# Patient Record
Sex: FEMALE | Race: WHITE | Employment: OTHER | URBAN - NONMETROPOLITAN AREA
[De-identification: names, ages, dates, MRNs, and addresses within clinical notes are randomized per-mention and may not be internally consistent; named-entity substitution may affect disease eponyms.]

---

## 2021-05-26 LAB
BILIRUBIN, URINE: NEGATIVE
BLOOD, URINE: ABNORMAL
CLARITY: ABNORMAL
COLOR: YELLOW
GLUCOSE URINE: NEGATIVE
KETONES, URINE: NEGATIVE
LEUKOCYTE ESTERASE, URINE: ABNORMAL
NITRITE, URINE: NEGATIVE
PH UA: 7 (ref 4.5–8)
PROTEIN UA: ABNORMAL
SPECIFIC GRAVITY, URINE: 1.02
UROBILINOGEN, URINE: NORMAL

## 2021-06-09 LAB
BILIRUBIN, URINE: NEGATIVE
BLOOD, URINE: ABNORMAL
CLARITY: ABNORMAL
COLOR: YELLOW
GLUCOSE URINE: NEGATIVE
KETONES, URINE: NEGATIVE
LEUKOCYTE ESTERASE, URINE: ABNORMAL
NITRITE, URINE: NEGATIVE
PH UA: 7.5 (ref 4.5–8)
PROTEIN UA: ABNORMAL
SPECIFIC GRAVITY, URINE: 1.02
UROBILINOGEN, URINE: NORMAL

## 2021-08-19 LAB
BUN BLDV-MCNC: 69 MG/DL
CALCIUM SERPL-MCNC: 9.5 MG/DL
CHLORIDE BLD-SCNC: 100 MMOL/L
CO2: 24 MMOL/L
CREAT SERPL-MCNC: 3 MG/DL
GFR CALCULATED: 15
GLUCOSE BLD-MCNC: 103 MG/DL
POTASSIUM SERPL-SCNC: 5.3 MMOL/L
SODIUM BLD-SCNC: 136 MMOL/L

## 2021-08-23 LAB
BUN BLDV-MCNC: 57 MG/DL
CALCIUM SERPL-MCNC: 9.7 MG/DL
CHLORIDE BLD-SCNC: 99 MMOL/L
CO2: 25 MMOL/L
CREAT SERPL-MCNC: 2.5 MG/DL
GFR CALCULATED: 18
GLUCOSE BLD-MCNC: 107 MG/DL
POTASSIUM SERPL-SCNC: 5.1 MMOL/L
SODIUM BLD-SCNC: 136 MMOL/L

## 2021-08-31 PROBLEM — M81.0 OSTEOPOROSIS: Status: ACTIVE | Noted: 2017-02-27

## 2021-08-31 PROBLEM — M17.12 OSTEOARTHRITIS OF LEFT KNEE: Status: ACTIVE | Noted: 2021-08-18

## 2021-08-31 PROBLEM — M12.9 ARTHROPATHY OF MULTIPLE SITES: Status: ACTIVE | Noted: 2017-02-06

## 2021-08-31 PROBLEM — N28.9 RENAL INSUFFICIENCY SYNDROME: Status: ACTIVE | Noted: 2018-12-19

## 2021-08-31 RX ORDER — LANOLIN ALCOHOL/MO/W.PET/CERES
500 CREAM (GRAM) TOPICAL 2 TIMES DAILY
COMMUNITY

## 2021-08-31 RX ORDER — ASPIRIN 81 MG/1
81 TABLET, CHEWABLE ORAL NIGHTLY
COMMUNITY

## 2021-08-31 RX ORDER — METOPROLOL SUCCINATE 25 MG/1
TABLET, EXTENDED RELEASE ORAL
COMMUNITY
Start: 2021-08-09

## 2021-08-31 RX ORDER — DICLOFENAC SODIUM 25 MG/1
TABLET, DELAYED RELEASE ORAL
COMMUNITY
Start: 2021-07-21 | End: 2021-09-01

## 2021-08-31 RX ORDER — OMEPRAZOLE 20 MG/1
20 TABLET, DELAYED RELEASE ORAL DAILY
COMMUNITY

## 2021-08-31 RX ORDER — MULTIVITAMIN WITH IRON
250 TABLET ORAL 2 TIMES DAILY
COMMUNITY

## 2021-08-31 RX ORDER — SIMVASTATIN 10 MG
TABLET ORAL
COMMUNITY
Start: 2021-07-21

## 2021-08-31 RX ORDER — B-COMPLEX WITH VITAMIN C
1 TABLET ORAL 2 TIMES DAILY
COMMUNITY

## 2021-08-31 RX ORDER — AMITRIPTYLINE HYDROCHLORIDE 50 MG/1
TABLET, FILM COATED ORAL
COMMUNITY
Start: 2021-07-21

## 2021-08-31 RX ORDER — OXYBUTYNIN CHLORIDE 5 MG/1
TABLET ORAL
COMMUNITY
Start: 2021-08-18 | End: 2021-09-01

## 2021-08-31 RX ORDER — ACETAMINOPHEN 325 MG/1
650 TABLET ORAL EVERY 6 HOURS PRN
COMMUNITY

## 2021-09-01 ENCOUNTER — OFFICE VISIT (OUTPATIENT)
Dept: NEPHROLOGY | Age: 86
End: 2021-09-01
Payer: MEDICARE

## 2021-09-01 VITALS
OXYGEN SATURATION: 97 % | HEART RATE: 92 BPM | WEIGHT: 152.8 LBS | DIASTOLIC BLOOD PRESSURE: 77 MMHG | TEMPERATURE: 97 F | SYSTOLIC BLOOD PRESSURE: 139 MMHG

## 2021-09-01 DIAGNOSIS — F19.90 EXCESSIVE USE OF NONSTEROIDAL ANTI-INFLAMMATORY DRUG (NSAID): ICD-10-CM

## 2021-09-01 DIAGNOSIS — N18.32 STAGE 3B CHRONIC KIDNEY DISEASE (HCC): Primary | ICD-10-CM

## 2021-09-01 DIAGNOSIS — I12.9 HYPERTENSIVE RENAL DISEASE, STAGE 1 THROUGH STAGE 4 OR UNSPECIFIED CHRONIC KIDNEY DISEASE: ICD-10-CM

## 2021-09-01 DIAGNOSIS — E87.5 HYPERKALEMIA: ICD-10-CM

## 2021-09-01 PROCEDURE — 99204 OFFICE O/P NEW MOD 45 MIN: CPT | Performed by: INTERNAL MEDICINE

## 2021-09-01 PROCEDURE — 1036F TOBACCO NON-USER: CPT | Performed by: INTERNAL MEDICINE

## 2021-09-01 PROCEDURE — 1123F ACP DISCUSS/DSCN MKR DOCD: CPT | Performed by: INTERNAL MEDICINE

## 2021-09-01 PROCEDURE — 1090F PRES/ABSN URINE INCON ASSESS: CPT | Performed by: INTERNAL MEDICINE

## 2021-09-01 PROCEDURE — G8427 DOCREV CUR MEDS BY ELIG CLIN: HCPCS | Performed by: INTERNAL MEDICINE

## 2021-09-01 PROCEDURE — 4040F PNEUMOC VAC/ADMIN/RCVD: CPT | Performed by: INTERNAL MEDICINE

## 2021-09-01 PROCEDURE — G8421 BMI NOT CALCULATED: HCPCS | Performed by: INTERNAL MEDICINE

## 2021-09-01 NOTE — PROGRESS NOTES
 RECTAL SURGERY      SIGMOIDOSCOPY      TUBAL LIGATION      UPPER GASTROINTESTINAL ENDOSCOPY          Family History:  Family History   Problem Relation Age of Onset    Cancer Mother     Cancer Sister         Social History:  Social History     Socioeconomic History    Marital status: Unknown     Spouse name: Not on file    Number of children: Not on file    Years of education: Not on file    Highest education level: Not on file   Occupational History    Not on file   Tobacco Use    Smoking status: Former Smoker    Smokeless tobacco: Never Used   Vaping Use    Vaping Use: Never used   Substance and Sexual Activity    Alcohol use: Not on file    Drug use: Not on file    Sexual activity: Not on file   Other Topics Concern    Not on file   Social History Narrative    Not on file     Social Determinants of Health     Financial Resource Strain:     Difficulty of Paying Living Expenses:    Food Insecurity:     Worried About [address] in the Last Year:     [address] in the Last Year:    Transportation Needs:     Lack of Transportation (Medical):      Lack of Transportation (Non-Medical):    Physical Activity:     Days of Exercise per Week:     Minutes of Exercise per Session:    Stress:     Feeling of Stress :    Social Connections:     Frequency of Communication with Friends and Family:     Frequency of Social Gatherings with Friends and Family:     Attends [employer] Services:     Active Member of Clubs or Organizations:     Attends Club or Organization Meetings:     Marital Status:    Intimate Partner Violence:     Fear of Current or Ex-Partner:     Emotionally Abused:     Physically Abused:     Sexually Abused:    Retired from office work     Review of Systems:  Constitutional: no fever or chills  Head: No headaches  Eyes: no blurry vision, no discharge  Ears: no ear pain or hearing changes  Nose: no runny nose or epistaxis  Respiratory: no shortness of breath or cough or sputum production  Cardiovascular: no chest pain  GI: no nausea, no vomiting or diarrhea  : denies any discharge  Skin: no rash  Musculoskeletal: +chronic joint pains, left knee pain. moves all ext  Neuro: no numbness or tingling, no slurred speech  Psychiatric: stable mood, no depression or insomnia    All other review of systems were reviewed and negative     Home Medications:  Prior to Admission medications    Medication Sig Start Date End Date Taking?  Authorizing Provider   calcium carbonate-vitamin D 600-200 MG-UNIT TABS Take 1 tablet by mouth 2 times daily   Yes Historical Provider, MD   amitriptyline (ELAVIL) 50 MG tablet TAKE 1 TABLET AT BEDTIME 7/21/21  Yes Historical Provider, MD   aspirin 81 MG chewable tablet Take 81 mg by mouth nightly   Yes Historical Provider, MD   magnesium (MAGNESIUM-OXIDE) 250 MG TABS tablet Take 250 mg by mouth 2 times daily   Yes Historical Provider, MD   metoprolol succinate (TOPROL XL) 25 MG extended release tablet TAKE 1 TABLET BY MOUTH ONCE DAILY 8/9/21  Yes Historical Provider, MD   niacin 500 MG extended release capsule Take 500 mg by mouth 2 times daily   Yes Historical Provider, MD   omeprazole (PRILOSEC OTC) 20 MG tablet Take 20 mg by mouth daily Taking 3 times per week temporarily   Yes Historical Provider, MD   simvastatin (ZOCOR) 10 MG tablet TAKE 1 TABLET EVERY EVENINGAT 6PM 7/21/21  Yes Historical Provider, MD   acetaminophen (TYLENOL) 325 MG tablet Take 650 mg by mouth every 6 hours as needed for Pain   Yes Historical Provider, MD   Multiple Vitamins-Minerals (ONE-A-DAY 50 PLUS PO) Take by mouth daily   Yes Historical Provider, MD   Omega-3 Fatty Acids (OMEGA-3 FISH OIL PO) Take by mouth daily   Yes Historical Provider, MD   diclofenac (VOLTAREN) 25 MG EC tablet TAKE 1 TABLET 2 TIMES DAILYAS NEEDED  Patient not taking: Reported on 9/1/2021 7/21/21   Historical Provider, MD        Physical Examination:  VITALS:  /77 (Site: Right Upper Arm, Position: Sitting, Cuff Size: Medium Adult)   Pulse 92   Temp 97 °F (36.1 °C)   Wt 152 lb 12.8 oz (69.3 kg)   SpO2 97%   There is no height or weight on file to calculate BMI. Wt Readings from Last 3 Encounters:   09/01/21 152 lb 12.8 oz (69.3 kg)     Constitutional and General Appearance: alert and cooperative with exam, appears comfortable, no distress, not diaphoretic  Eyes: no icteric sclera in left eye or right eye  Ears and Nose: normal external appearance of left and right ear. Oral: moist oral mucus membranes  Neck: No jugular venous distention  Lungs: Air entry B/L, no crackles or rales, no use of accessory muscles or labored breathing  Heart: S1, S2  Extremities: No pitting LE edema, no tenderness  GI: soft, non-tender, no guarding, no distention  Skin: no rash seen on exposed extremities, warm to touch  Musculo: moves all extremities  Neuro: no slurred speech, no facial drooping  Psychiatric: Normal mood and affect, Not agitated     Laboratory & Diagnostics:  Old progress notes from referring physician reviewed. Sept 2021: Creat 1.4  Feb 2021: Creat 1.5  Aug 2021: Creat 3.0 and now down to 2.5     Impression/Plan:   1. CKD IIIb with some progression vs JANY on CKD: She has been on diclofenac BID for over 15+ years. Other risk factors include HTN and senile nephrosclerosis. Will send work-up including UA, urine protein-creatinine ratio and kidney US to evaluate echogenicity and kidney size. Discussed with patient to avoid NSAID like diclofenac. Advised low salt diet. Goal is to control BP and avoid high red meat intake. Will also check CBC and markers of chronicity including PTH and phos. 2. Excessive use of NSAIDs: stopped Aug 2021. Discussed renal side effects of nonsteroidals. 3. HTN: on Toprol, advised low salt diet. 4. Mild hyperkalemia: in setting of JANY/CKD and hyperkalemia. Will monitor. Advised low potassium diet. 5. Hx remote smoking  6. Arthritis    D/w pt in detail.  Thought process was discussed with the patient  Thank you for the referral  Please do not hesitate to contact me if you have any questions or concerns  I will make further recommendations depending on clinical course and lab/diagnostic results    Orders Placed This Encounter   Procedures    US RENAL COMPLETE    Basic Metabolic Panel    CBC    PTH, Intact    Phosphorus    Vitamin D 25 Hydroxy    Protein / creatinine ratio, urine    Urinalysis     Return in about 4 weeks (around 9/29/2021).     Wilberto Orellana MD  [facility]

## 2021-09-17 LAB
BASOPHILS ABSOLUTE: ABNORMAL
BASOPHILS RELATIVE PERCENT: ABNORMAL
BILIRUBIN, URINE: NEGATIVE
BLOOD, URINE: POSITIVE
BUN BLDV-MCNC: 48 MG/DL
CALCIUM SERPL-MCNC: 9.9 MG/DL
CHLORIDE BLD-SCNC: 96 MMOL/L
CLARITY: CLEAR
CO2: 29 MMOL/L
COLOR: YELLOW
CREAT SERPL-MCNC: 2 MG/DL
EOSINOPHILS ABSOLUTE: ABNORMAL
EOSINOPHILS RELATIVE PERCENT: ABNORMAL
GFR CALCULATED: 23
GLUCOSE BLD-MCNC: 95 MG/DL
GLUCOSE URINE: NEGATIVE
HCT VFR BLD CALC: 28.1 % (ref 36–46)
HEMOGLOBIN: 8.8 G/DL (ref 12–16)
KETONES, URINE: NEGATIVE
LEUKOCYTE ESTERASE, URINE: POSITIVE
LYMPHOCYTES ABSOLUTE: ABNORMAL
LYMPHOCYTES RELATIVE PERCENT: ABNORMAL
MCH RBC QN AUTO: ABNORMAL PG
MCHC RBC AUTO-ENTMCNC: ABNORMAL G/DL
MCV RBC AUTO: ABNORMAL FL
MONOCYTES ABSOLUTE: ABNORMAL
MONOCYTES RELATIVE PERCENT: ABNORMAL
NEUTROPHILS ABSOLUTE: ABNORMAL
NEUTROPHILS RELATIVE PERCENT: ABNORMAL
NITRITE, URINE: NEGATIVE
PH UA: 7 (ref 4.5–8)
PHOSPHORUS: 4.6 MG/DL
PLATELET # BLD: 436 K/ΜL
PMV BLD AUTO: ABNORMAL FL
POTASSIUM SERPL-SCNC: 4.9 MMOL/L
PROTEIN UA: POSITIVE
PTH INTACT: 30
RBC # BLD: 2.84 10^6/ΜL
SODIUM BLD-SCNC: 138 MMOL/L
SPECIFIC GRAVITY, URINE: 1.02
UROBILINOGEN, URINE: NORMAL
VITAMIN D 25-HYDROXY: 50.6
VITAMIN D2, 25 HYDROXY: NORMAL
VITAMIN D3,25 HYDROXY: NORMAL
WBC # BLD: 7.2 10^3/ML

## 2021-09-20 ENCOUNTER — TELEPHONE (OUTPATIENT)
Dept: NEPHROLOGY | Age: 86
End: 2021-09-20

## 2021-09-20 DIAGNOSIS — D63.1 ANEMIA IN STAGE 3B CHRONIC KIDNEY DISEASE (HCC): Primary | ICD-10-CM

## 2021-09-20 DIAGNOSIS — N18.32 ANEMIA IN STAGE 3B CHRONIC KIDNEY DISEASE (HCC): Primary | ICD-10-CM

## 2021-09-20 NOTE — TELEPHONE ENCOUNTER
She states that she has UTI symptoms sometimes. It comes and goes. No symptoms today. She has vaginal prolapse if that makes a difference.

## 2021-09-22 ENCOUNTER — TELEPHONE (OUTPATIENT)
Dept: NEPHROLOGY | Age: 86
End: 2021-09-22

## 2021-09-22 RX ORDER — FERROUS SULFATE 325(65) MG
325 TABLET ORAL 2 TIMES DAILY
Qty: 60 TABLET | Refills: 2 | Status: SHIPPED | OUTPATIENT
Start: 2021-09-22 | End: 2021-12-27

## 2021-09-22 RX ORDER — FERROUS SULFATE 325(65) MG
325 TABLET ORAL 2 TIMES DAILY
COMMUNITY
End: 2021-09-22 | Stop reason: SDUPTHER

## 2021-09-22 NOTE — TELEPHONE ENCOUNTER
----- Message from Nimisha Beaver MD sent at 9/22/2021  3:10 PM EDT -----  Ferrous sulfate 325 mg po BID  ----- Message -----  From: Hoa Gonzalez  Sent: 9/22/2021   8:15 AM EDT  To: Nimisha Beaver MD

## 2021-09-29 ENCOUNTER — OFFICE VISIT (OUTPATIENT)
Dept: NEPHROLOGY | Age: 86
End: 2021-09-29
Payer: MEDICARE

## 2021-09-29 VITALS
WEIGHT: 153 LBS | OXYGEN SATURATION: 100 % | HEART RATE: 91 BPM | TEMPERATURE: 97.3 F | DIASTOLIC BLOOD PRESSURE: 74 MMHG | SYSTOLIC BLOOD PRESSURE: 142 MMHG

## 2021-09-29 DIAGNOSIS — E61.1 IRON DEFICIENCY: ICD-10-CM

## 2021-09-29 DIAGNOSIS — F19.90 EXCESSIVE USE OF NONSTEROIDAL ANTI-INFLAMMATORY DRUGS (NSAIDS): ICD-10-CM

## 2021-09-29 DIAGNOSIS — N18.4 CKD (CHRONIC KIDNEY DISEASE), STAGE IV (HCC): Primary | ICD-10-CM

## 2021-09-29 PROCEDURE — G8427 DOCREV CUR MEDS BY ELIG CLIN: HCPCS | Performed by: INTERNAL MEDICINE

## 2021-09-29 PROCEDURE — G8421 BMI NOT CALCULATED: HCPCS | Performed by: INTERNAL MEDICINE

## 2021-09-29 PROCEDURE — 1036F TOBACCO NON-USER: CPT | Performed by: INTERNAL MEDICINE

## 2021-09-29 PROCEDURE — 99213 OFFICE O/P EST LOW 20 MIN: CPT | Performed by: INTERNAL MEDICINE

## 2021-09-29 PROCEDURE — 1090F PRES/ABSN URINE INCON ASSESS: CPT | Performed by: INTERNAL MEDICINE

## 2021-09-29 PROCEDURE — 4040F PNEUMOC VAC/ADMIN/RCVD: CPT | Performed by: INTERNAL MEDICINE

## 2021-09-29 PROCEDURE — 1123F ACP DISCUSS/DSCN MKR DOCD: CPT | Performed by: INTERNAL MEDICINE

## 2021-09-29 NOTE — PROGRESS NOTES
100%   Wt Readings from Last 3 Encounters:   09/29/21 153 lb (69.4 kg)   09/01/21 152 lb 12.8 oz (69.3 kg)     There is no height or weight on file to calculate BMI. General Appearance: alert and cooperative with exam, appears comfortable, no distress  Oral: moist oral mucus membranes  Neck: No jugular venous distention  Lungs: Air entry B/L, no crackles or rales, no use of accessory muscles  Heart: S1, S2 heard  GI: soft, non-tender, no guarding  Extremities: No sig LE edema     Medications:  Current Outpatient Medications   Medication Sig Dispense Refill    ferrous sulfate (IRON 325) 325 (65 Fe) MG tablet Take 1 tablet by mouth 2 times daily 60 tablet 2    calcium carbonate-vitamin D 600-200 MG-UNIT TABS Take 1 tablet by mouth 2 times daily      amitriptyline (ELAVIL) 50 MG tablet TAKE 1 TABLET AT BEDTIME      aspirin 81 MG chewable tablet Take 81 mg by mouth nightly      magnesium (MAGNESIUM-OXIDE) 250 MG TABS tablet Take 250 mg by mouth 2 times daily      metoprolol succinate (TOPROL XL) 25 MG extended release tablet TAKE 1 TABLET BY MOUTH ONCE DAILY      niacin 500 MG extended release capsule Take 500 mg by mouth 2 times daily      omeprazole (PRILOSEC OTC) 20 MG tablet Take 20 mg by mouth daily Taking 3 times per week temporarily      simvastatin (ZOCOR) 10 MG tablet TAKE 1 TABLET EVERY EVENINGAT 6PM      acetaminophen (TYLENOL) 325 MG tablet Take 650 mg by mouth every 6 hours as needed for Pain      Multiple Vitamins-Minerals (ONE-A-DAY 50 PLUS PO) Take by mouth daily      Omega-3 Fatty Acids (OMEGA-3 FISH OIL PO) Take by mouth daily       No current facility-administered medications for this visit.         Laboratory & Diagnostics:  US: R 8.2,L 10.7 cm, 1.7 cm Left kidney cyst  Sept 2020: Creat 1.4  Feb 2021: Creat 1.5  Aug 2021: Creat 3.0 and now down to 2.5    Sept 2021: Iron saturation 20%, Ferritin 44  UPCR 500 mg/g, UA: trace protein, 2+ blood, 6-10 RBC  K 4.9, creat 2.0  Sept 2021: hb 8.8, Plt 436, Vit D 50.6, PTH 30     Impression/Plan:   1. CKD IV with recent JANY: She was on diclofenac BID for over 15+ years. Other risk factors include HTN and senile nephrosclerosis. UPCR is about 500 mg/g, She is no longer taking NSAIDs. Creat is 2.0 and suspect this is likely her baseline. Will monitor. Advised to avoid NSAIDs. Increase water intake. Creat is stable at this time. 2. Excessive use of NSAIDs: stopped Aug 2021. Discussed renal side effects of nonsteroidals. 3. HTN: on Toprol, advised low salt diet. 4. Mild hyperkalemia: in setting of JANY/CKD and hyperkalemia. Resolved. No longer taking NSAIDs  5. Hx remote smoking  6. Arthritis  7. Anemia: Iron studies noted, recently started oral iron, repeat H/H in 6 weeks. Pt reports she noticed some dark stools recently before she was started on oral iron but has not noted anything abnormal recently. She does not recall having had colonoscopy recently. She says she will discuss with her PCP doctor. Repeat H&H in 1 month    Orders Placed This Encounter   Procedures    Hemoglobin and Hematocrit, Blood    Basic Metabolic Panel    Hemoglobin and Hematocrit, Blood    PTH, Intact    Phosphorus     Return in about 4 months (around 1/29/2022).       Mary Lou Suh MD  [facility]

## 2021-10-29 LAB
BASOPHILS ABSOLUTE: ABNORMAL
BASOPHILS RELATIVE PERCENT: ABNORMAL
EOSINOPHILS ABSOLUTE: ABNORMAL
EOSINOPHILS RELATIVE PERCENT: ABNORMAL
HCT VFR BLD CALC: 29.9 % (ref 36–46)
HEMOGLOBIN: 9.8 G/DL (ref 12–16)
LYMPHOCYTES ABSOLUTE: ABNORMAL
LYMPHOCYTES RELATIVE PERCENT: ABNORMAL
MCH RBC QN AUTO: ABNORMAL PG
MCHC RBC AUTO-ENTMCNC: ABNORMAL G/DL
MCV RBC AUTO: ABNORMAL FL
MONOCYTES ABSOLUTE: ABNORMAL
MONOCYTES RELATIVE PERCENT: ABNORMAL
NEUTROPHILS ABSOLUTE: ABNORMAL
NEUTROPHILS RELATIVE PERCENT: ABNORMAL
PLATELET # BLD: 321 K/ΜL
PMV BLD AUTO: ABNORMAL FL
RBC # BLD: 3.1 10^6/ΜL
WBC # BLD: 6.3 10^3/ML

## 2021-12-27 DIAGNOSIS — D63.1 ANEMIA IN STAGE 3B CHRONIC KIDNEY DISEASE (HCC): Primary | ICD-10-CM

## 2021-12-27 DIAGNOSIS — N18.32 ANEMIA IN STAGE 3B CHRONIC KIDNEY DISEASE (HCC): Primary | ICD-10-CM

## 2021-12-27 RX ORDER — FERROUS SULFATE 325(65) MG
325 TABLET ORAL 2 TIMES DAILY
Qty: 60 TABLET | Refills: 2 | Status: SHIPPED | OUTPATIENT
Start: 2021-12-27

## 2021-12-28 NOTE — TELEPHONE ENCOUNTER
Spoke to pt and she understood that ferritin and iron saturation lab orders are being added to her lab work prior to her appt. She already has an order for an h&h. Lab orders pending.

## 2022-02-02 ENCOUNTER — OFFICE VISIT (OUTPATIENT)
Dept: NEPHROLOGY | Age: 87
End: 2022-02-02
Payer: MEDICARE

## 2022-02-02 VITALS
OXYGEN SATURATION: 99 % | WEIGHT: 156 LBS | TEMPERATURE: 97.5 F | SYSTOLIC BLOOD PRESSURE: 143 MMHG | HEART RATE: 93 BPM | DIASTOLIC BLOOD PRESSURE: 76 MMHG

## 2022-02-02 DIAGNOSIS — N18.4 ANEMIA IN STAGE 4 CHRONIC KIDNEY DISEASE (HCC): ICD-10-CM

## 2022-02-02 DIAGNOSIS — N18.4 CKD (CHRONIC KIDNEY DISEASE), STAGE IV (HCC): Primary | ICD-10-CM

## 2022-02-02 DIAGNOSIS — D63.1 ANEMIA IN STAGE 4 CHRONIC KIDNEY DISEASE (HCC): ICD-10-CM

## 2022-02-02 PROCEDURE — 1123F ACP DISCUSS/DSCN MKR DOCD: CPT | Performed by: INTERNAL MEDICINE

## 2022-02-02 PROCEDURE — G8484 FLU IMMUNIZE NO ADMIN: HCPCS | Performed by: INTERNAL MEDICINE

## 2022-02-02 PROCEDURE — G8421 BMI NOT CALCULATED: HCPCS | Performed by: INTERNAL MEDICINE

## 2022-02-02 PROCEDURE — 1036F TOBACCO NON-USER: CPT | Performed by: INTERNAL MEDICINE

## 2022-02-02 PROCEDURE — 1090F PRES/ABSN URINE INCON ASSESS: CPT | Performed by: INTERNAL MEDICINE

## 2022-02-02 PROCEDURE — 99213 OFFICE O/P EST LOW 20 MIN: CPT | Performed by: INTERNAL MEDICINE

## 2022-02-02 PROCEDURE — G8427 DOCREV CUR MEDS BY ELIG CLIN: HCPCS | Performed by: INTERNAL MEDICINE

## 2022-02-02 PROCEDURE — 4040F PNEUMOC VAC/ADMIN/RCVD: CPT | Performed by: INTERNAL MEDICINE

## 2022-02-02 NOTE — PROGRESS NOTES
[address] [facility] [address]  Dept: [phone]  Loc: [phone]  Office Progress Note  2/2/2022 12:07 PM      Pt Name:    Seth Helm  YOB: 1931  Primary Care Physician:  HILDA Pugh CNP     Chief Complaint:   Chief Complaint   Patient presents with    Chronic Kidney Disease     Stage 4        Background Information/Interval History:   The patient is a 80 y.o. pleasant white female with hx HTN, anxiety, hx UTI who is here for follow-up evaluation of elevated creatinine. Serum creatinine was 1.4 in Sept 2019 and 1.5 in Feb 2019. Has hx knee arthritis. No family hx kidney problems. She reports hx HTN and mostly numbers are in 120-130s at home. Used to smoke but quit about 40 years ago, smoked 2 packs per week for 4 years. She reports she saw rheumatologist for arthritis in [place] and was previously on arthrotec. Subsequently she took diclofenac for atleast 20+ years twice daily. She stopped taking this on Aug 20, 2021. She has also been on PPI for many years. BP mostly in 120-130s range. Has some arthritis pain. No new complaints. Appetite is good. No urinary complaints. Says she was not eating well last year after her  passed away from [facility].       Past History:  Past Medical History:   Diagnosis Date    Anxiety     Dysuria     GERD (gastroesophageal reflux disease)     Hypertension     Insomnia     Mixed hyperlipidemia     Osteoarthritis     Renal insufficiency      Past Surgical History:   Procedure Laterality Date    COLONOSCOPY      HYSTERECTOMY      RECTAL SURGERY      SIGMOIDOSCOPY      TUBAL LIGATION      UPPER GASTROINTESTINAL ENDOSCOPY          VITALS:  BP (!) 143/76 (Site: Right Upper Arm, Position: Sitting, Cuff Size: Medium Adult)   Pulse 93   Temp 97.5 °F (36.4 °C)   Wt 156 lb (70.8 kg)   SpO2 99%   Wt Readings from Last 3 Encounters:   02/02/22 156 lb (70.8 kg)   09/29/21 153 lb (69.4 kg)   09/01/21 152 lb 12.8 oz (69.3 kg)     There is no height or weight on file to calculate BMI. General Appearance: alert and cooperative with exam, appears comfortable, no distress  Neck: No jugular venous distention  Lungs: Air entry B/L, no crackles or rales, no use of accessory muscles  Heart: S1, S2 heard  GI: soft, non-tender, no guarding  Extremities: No sig LE edema     Medications:  Current Outpatient Medications   Medication Sig Dispense Refill    ferrous sulfate (IRON 325) 325 (65 Fe) MG tablet Take 1 tablet by mouth 2 times daily 60 tablet 2    calcium carbonate-vitamin D 600-200 MG-UNIT TABS Take 1 tablet by mouth 2 times daily      amitriptyline (ELAVIL) 50 MG tablet TAKE 1 TABLET AT BEDTIME      aspirin 81 MG chewable tablet Take 81 mg by mouth nightly      magnesium (MAGNESIUM-OXIDE) 250 MG TABS tablet Take 250 mg by mouth 2 times daily      metoprolol succinate (TOPROL XL) 25 MG extended release tablet TAKE 1 TABLET BY MOUTH ONCE DAILY      niacin 500 MG extended release capsule Take 500 mg by mouth 2 times daily      omeprazole (PRILOSEC OTC) 20 MG tablet Take 20 mg by mouth daily Taking 3 times per week temporarily      simvastatin (ZOCOR) 10 MG tablet TAKE 1 TABLET EVERY EVENINGAT 6PM      acetaminophen (TYLENOL) 325 MG tablet Take 650 mg by mouth every 6 hours as needed for Pain      Multiple Vitamins-Minerals (ONE-A-DAY 50 PLUS PO) Take by mouth daily      Omega-3 Fatty Acids (OMEGA-3 FISH OIL PO) Take by mouth daily       No current facility-administered medications for this visit.         Laboratory & Diagnostics:  US: R 8.2,L 10.7 cm, 1.7 cm Left kidney cyst  Sept 2020: Creat 1.4  Feb 2021: Creat 1.5  Aug 2021: Creat 3.0 and now down to 2.5    Sept 2021: Iron saturation 20%, Ferritin 44  UPCR 500 mg/g, UA: trace protein, 2+ blood, 6-10 RBC  K 4.9, creat 2.0  Sept 2021: hb 8.8, Plt 436, Vit D 50.6, PTH 30,  mg/g  OCt 2021: Hb 9.8  Sept 2021: K 4.9, Creat 1.7, phos 4.7, Hb 10.4, Ferritin 42, saturation 31%     Impression/Plan:   1. CKD IV: She was on diclofenac BID for over 15+ years. Other risk factors include HTN and senile nephrosclerosis. UPCR is about 500 mg/g. Stable renal function at this time. Serum creatinine is 1.7 at this time  2. Excessive use of NSAIDs: stopped Aug 2021  3. HTN: on Toprol, advised low salt diet. Home BP readings noted, mostly in 120-130 range. No need to add another BP medication. Continue to monitor at home. 4. Hx remote smoking  5. Arthritis: doing better, not taking any NSAIDs  6. Anemia in CKD 4: Hb improving, tolerating Iron tablets. Says she did not have any vinh blood in stool. She saw her PCP. 7.  Left kidney cyst    Orders Placed This Encounter   Procedures    Basic Metabolic Panel    Hemoglobin and Hematocrit, Blood    PTH, Intact    Phosphorus    Iron Binding Capacity    Iron    Ferritin    IRON SATURATION     Return in about 9 months (around 11/2/2022).     Batsheva Schwartz MD  [facility]

## 2022-10-25 LAB — PTH INTACT: 47

## 2022-11-02 ENCOUNTER — OFFICE VISIT (OUTPATIENT)
Dept: NEPHROLOGY | Age: 87
End: 2022-11-02
Payer: MEDICARE

## 2022-11-02 VITALS
OXYGEN SATURATION: 97 % | SYSTOLIC BLOOD PRESSURE: 139 MMHG | HEART RATE: 87 BPM | WEIGHT: 157 LBS | DIASTOLIC BLOOD PRESSURE: 76 MMHG

## 2022-11-02 DIAGNOSIS — N18.32 CHRONIC KIDNEY DISEASE, STAGE 3B (HCC): Primary | ICD-10-CM

## 2022-11-02 DIAGNOSIS — N28.1 RENAL CYST: ICD-10-CM

## 2022-11-02 PROCEDURE — 1036F TOBACCO NON-USER: CPT | Performed by: INTERNAL MEDICINE

## 2022-11-02 PROCEDURE — G8427 DOCREV CUR MEDS BY ELIG CLIN: HCPCS | Performed by: INTERNAL MEDICINE

## 2022-11-02 PROCEDURE — G8484 FLU IMMUNIZE NO ADMIN: HCPCS | Performed by: INTERNAL MEDICINE

## 2022-11-02 PROCEDURE — 1090F PRES/ABSN URINE INCON ASSESS: CPT | Performed by: INTERNAL MEDICINE

## 2022-11-02 PROCEDURE — G8421 BMI NOT CALCULATED: HCPCS | Performed by: INTERNAL MEDICINE

## 2022-11-02 PROCEDURE — 1123F ACP DISCUSS/DSCN MKR DOCD: CPT | Performed by: INTERNAL MEDICINE

## 2022-11-02 PROCEDURE — 99213 OFFICE O/P EST LOW 20 MIN: CPT | Performed by: INTERNAL MEDICINE

## 2022-11-02 RX ORDER — OXYBUTYNIN CHLORIDE 5 MG/1
5 TABLET ORAL NIGHTLY
COMMUNITY
Start: 2022-09-09

## 2022-11-02 NOTE — PROGRESS NOTES
[address] [facility]  [address] [address]  Dept: [phone]  Loc: [phone]  Office Progress Note  11/2/2022 12:53 PM      Pt Name:    Milana Ornelas  YOB: 1931  Primary Care Physician:  HILDA Lyons - CNP     Chief Complaint:   Chief Complaint   Patient presents with    Chronic Kidney Disease     Stage 3        Background Information/Interval History:   The patient is a 80 y.o. pleasant white female with hx HTN, anxiety, hx UTI who is here for follow-up evaluation of elevated creatinine. Serum creatinine was 1.4 in Sept 2019 and 1.5 in Feb 2019. Has hx knee arthritis. No family hx kidney problems. She reports hx HTN and mostly numbers are in 120-130s at home. Used to smoke but quit about 40 years ago, smoked 2 packs per week for 4 years. She reports she saw rheumatologist for arthritis in [place] and was previously on arthrotec. Subsequently she took diclofenac for atleast 20+ years twice daily. She stopped taking this on Aug 20, 2021. She has also been on PPI for many years. Here for follow-up. Now taking oxybutynin (PCP). Doing better. No leg swelling. No sp complaints. BP was 140/60. HR was normal per patient. She is no longer taking diclofenac.       Past History:  Past Medical History:   Diagnosis Date    Anxiety     Dysuria     GERD (gastroesophageal reflux disease)     Hypertension     Insomnia     Mixed hyperlipidemia     Osteoarthritis     Renal insufficiency      Past Surgical History:   Procedure Laterality Date    COLONOSCOPY      HYSTERECTOMY (CERVIX STATUS UNKNOWN)      RECTAL SURGERY      SIGMOIDOSCOPY      TUBAL LIGATION      UPPER GASTROINTESTINAL ENDOSCOPY          VITALS:  /76 (Site: Left Upper Arm, Position: Sitting, Cuff Size: Medium Adult)   Pulse 87   Wt 157 lb (71.2 kg)   SpO2 97%   Wt Readings from Last 3 Encounters:   11/02/22 157 lb (71.2 kg)   02/02/22 156 lb (70.8 kg) 09/29/21 153 lb (69.4 kg)     There is no height or weight on file to calculate BMI. General Appearance: alert and cooperative with exam, appears comfortable, no distress  Neck: No jugular venous distention  Lungs: Air entry B/L, no crackles or rales, no use of accessory muscles  Heart: S1, S2 heard  GI: soft, non-tender, no guarding  Extremities: No sig LE edema     Medications:  Current Outpatient Medications   Medication Sig Dispense Refill    oxybutynin (DITROPAN) 5 MG tablet Take 5 mg by mouth nightly      ferrous sulfate (IRON 325) 325 (65 Fe) MG tablet Take 1 tablet by mouth 2 times daily 60 tablet 2    calcium carbonate-vitamin D 600-200 MG-UNIT TABS Take 1 tablet by mouth 2 times daily      amitriptyline (ELAVIL) 50 MG tablet TAKE 1 TABLET AT BEDTIME      aspirin 81 MG chewable tablet Take 81 mg by mouth nightly      magnesium (MAGNESIUM-OXIDE) 250 MG TABS tablet Take 250 mg by mouth 2 times daily      metoprolol succinate (TOPROL XL) 25 MG extended release tablet TAKE 1 TABLET BY MOUTH ONCE DAILY      niacin 500 MG extended release capsule Take 500 mg by mouth 2 times daily      omeprazole (PRILOSEC OTC) 20 MG tablet Take 20 mg by mouth daily Taking 3 times per week temporarily      simvastatin (ZOCOR) 10 MG tablet TAKE 1 TABLET EVERY EVENINGAT 6PM      acetaminophen (TYLENOL) 325 MG tablet Take 650 mg by mouth every 6 hours as needed for Pain      Multiple Vitamins-Minerals (ONE-A-DAY 50 PLUS PO) Take by mouth daily      Omega-3 Fatty Acids (OMEGA-3 FISH OIL PO) Take by mouth daily       No current facility-administered medications for this visit. Laboratory & Diagnostics:  US: R 8.2,L 10.7 cm, 1.7 cm Left kidney cyst  Sept 2020: Creat 1.4  Feb 2021: Creat 1.5  Aug 2021: Creat 3.0 and now down to 2.5    Sept 2021: Iron saturation 20%, Ferritin 44  UPCR 500 mg/g, UA: trace protein, 2+ blood, 6-10 RBC  K 4.9, creat 2.0  Sept 2021: hb 8.8, Plt 436, Vit D 50.6, PTH 30,  mg/g  OCt 2021:  Hb 9.8  Sept 2021: K 4.9, Creat 1.7, phos 4.7, Hb 10.4, Ferritin 42, saturation 31%  Oct 2022: Creat 1.59, K 4.8, Ca 9.4, Hb 10.8, Percent iron 25%, Ferritin 93, PTH 47     Impression/Plan:   1. CKD IIIb: She was on diclofenac BID for over 15+ years. No longer taking it. Other risk factors include HTN and senile nephrosclerosis. 2. Excessive use of NSAIDs: stopped Aug 2021  3. HTN: on Toprol, stable  4. Hx remote smoking  5. Arthritis: doing better, not taking any NSAIDs  6. Anemia in CKD 4: on iron tablets. 7.  Left kidney cyst: Will repeat US in 2 years. Orders Placed This Encounter   Procedures    Basic Metabolic Panel    Hemoglobin and Hematocrit    PTH, Intact    Phosphorus    Magnesium     Return in about 1 year (around 11/2/2023).       Tiago Milligan MD  [facility]

## 2023-11-01 ENCOUNTER — OFFICE VISIT (OUTPATIENT)
Dept: NEPHROLOGY | Age: 88
End: 2023-11-01
Payer: MEDICARE

## 2023-11-01 VITALS
WEIGHT: 149 LBS | HEART RATE: 96 BPM | SYSTOLIC BLOOD PRESSURE: 143 MMHG | OXYGEN SATURATION: 98 % | DIASTOLIC BLOOD PRESSURE: 73 MMHG

## 2023-11-01 DIAGNOSIS — D63.1 ANEMIA IN STAGE 3B CHRONIC KIDNEY DISEASE (HCC): ICD-10-CM

## 2023-11-01 DIAGNOSIS — R32 URINARY INCONTINENCE, UNSPECIFIED TYPE: ICD-10-CM

## 2023-11-01 DIAGNOSIS — I12.9 HYPERTENSIVE RENAL DISEASE, STAGE 1 THROUGH STAGE 4 OR UNSPECIFIED CHRONIC KIDNEY DISEASE: ICD-10-CM

## 2023-11-01 DIAGNOSIS — N18.32 ANEMIA IN STAGE 3B CHRONIC KIDNEY DISEASE (HCC): ICD-10-CM

## 2023-11-01 DIAGNOSIS — E87.5 HYPERKALEMIA: ICD-10-CM

## 2023-11-01 DIAGNOSIS — N18.32 CHRONIC KIDNEY DISEASE, STAGE 3B (HCC): Primary | ICD-10-CM

## 2023-11-01 PROCEDURE — 1123F ACP DISCUSS/DSCN MKR DOCD: CPT | Performed by: INTERNAL MEDICINE

## 2023-11-01 PROCEDURE — 1090F PRES/ABSN URINE INCON ASSESS: CPT | Performed by: INTERNAL MEDICINE

## 2023-11-01 PROCEDURE — 99214 OFFICE O/P EST MOD 30 MIN: CPT | Performed by: INTERNAL MEDICINE

## 2023-11-01 PROCEDURE — 0509F URINE INCON PLAN DOCD: CPT | Performed by: INTERNAL MEDICINE

## 2023-11-01 PROCEDURE — 1036F TOBACCO NON-USER: CPT | Performed by: INTERNAL MEDICINE

## 2023-11-01 PROCEDURE — G8427 DOCREV CUR MEDS BY ELIG CLIN: HCPCS | Performed by: INTERNAL MEDICINE

## 2023-11-01 PROCEDURE — G8421 BMI NOT CALCULATED: HCPCS | Performed by: INTERNAL MEDICINE

## 2023-11-01 PROCEDURE — G8484 FLU IMMUNIZE NO ADMIN: HCPCS | Performed by: INTERNAL MEDICINE

## 2023-11-01 RX ORDER — OXYBUTYNIN CHLORIDE 10 MG/1
10 TABLET, EXTENDED RELEASE ORAL DAILY
Qty: 30 TABLET | Refills: 0 | Status: SHIPPED | OUTPATIENT
Start: 2023-11-01

## 2023-11-01 RX ORDER — OXYBUTYNIN CHLORIDE 10 MG/1
10 TABLET, EXTENDED RELEASE ORAL DAILY
COMMUNITY
Start: 2023-10-04 | End: 2023-11-01

## 2023-11-01 NOTE — PROGRESS NOTES
[address] [facility] [address]  Dept: [phone]  Loc: [phone]  Office Progress Note  11/1/2023 12:31 PM      Pt Name:    Norma Snyder  YOB: 1931  Primary Care Physician:  HILDA Clement CNP     Chief Complaint:   Chief Complaint   Patient presents with    Chronic Kidney Disease     Stage 3        Background Information/Interval History:   The patient is a 80 y.o. pleasant white female with hx HTN, anxiety, hx UTI who is here for follow-up evaluation of elevated creatinine. Serum creatinine was 1.4 in Sept 2019 and 1.5 in Feb 2019. Has hx knee arthritis. No family hx kidney problems. She reports hx HTN and mostly numbers are in 120-130s at home. Used to smoke but quit about 40 years ago, smoked 2 packs per week for 4 years. She reports she saw rheumatologist for arthritis in [place] and was previously on arthrotec. Subsequently she took diclofenac for atleast 20+ years twice daily. She stopped taking this on Aug 20, 2021. She has also been on PPI for many years. Since last office she had worsening urinary incontinence. Saw Dr. Wesley Sy who referred her to Dr. Dalia Archuleta (urology). She reports pessary did not work for pelvic prolapse. She reports she had SP catheter placed but it was causing lot of pain and issues. She says she then had temp mc catheter placed and eventually that was removed. So, at this point both mc and SP catheter have been removed. She does not want to follow with Dr. Dalia Archuleta. She is requesting to see another urologist. Will refer her to [facility] urology.      Past History:  Past Medical History:   Diagnosis Date    Anxiety     Dysuria     GERD (gastroesophageal reflux disease)     Hypertension     Insomnia     Mixed hyperlipidemia     Osteoarthritis     Renal insufficiency      Past Surgical History:   Procedure Laterality Date    COLONOSCOPY      HYSTERECTOMY (CERVIX

## 2023-11-01 NOTE — PROGRESS NOTES
Wants to discuss the importance of oxybutynin. Wants to discuss with you who can put in a pubic catheter. She does not want to go back to Dr. Bhaskar Yee.

## 2023-11-02 ENCOUNTER — TELEPHONE (OUTPATIENT)
Dept: NEPHROLOGY | Age: 88
End: 2023-11-02

## 2023-11-02 DIAGNOSIS — R32 URINARY INCONTINENCE IN FEMALE: Primary | ICD-10-CM

## 2023-11-02 NOTE — TELEPHONE ENCOUNTER
Spoke to pt, she understands that she is being referred to a urologist in [facility].       Referral pending

## 2023-11-02 NOTE — TELEPHONE ENCOUNTER
----- Message from Marlin Meeks MD sent at 11/2/2023  2:03 PM EDT -----  Pls refer her to urology, [facility] urology ([facility] office): Dx: severe urinary incontinence.

## 2023-11-27 ENCOUNTER — OFFICE VISIT (OUTPATIENT)
Dept: UROLOGY | Age: 88
End: 2023-11-27
Payer: MEDICARE

## 2023-11-27 VITALS — HEIGHT: 64 IN | WEIGHT: 146 LBS | BODY MASS INDEX: 24.92 KG/M2 | RESPIRATION RATE: 18 BRPM

## 2023-11-27 DIAGNOSIS — N81.11 MIDLINE CYSTOCELE: Primary | ICD-10-CM

## 2023-11-27 DIAGNOSIS — N39.46 MIXED INCONTINENCE: ICD-10-CM

## 2023-11-27 PROCEDURE — G8484 FLU IMMUNIZE NO ADMIN: HCPCS | Performed by: UROLOGY

## 2023-11-27 PROCEDURE — 1090F PRES/ABSN URINE INCON ASSESS: CPT | Performed by: UROLOGY

## 2023-11-27 PROCEDURE — 0509F URINE INCON PLAN DOCD: CPT | Performed by: UROLOGY

## 2023-11-27 PROCEDURE — 99204 OFFICE O/P NEW MOD 45 MIN: CPT | Performed by: UROLOGY

## 2023-11-27 PROCEDURE — G8419 CALC BMI OUT NRM PARAM NOF/U: HCPCS | Performed by: UROLOGY

## 2023-11-27 PROCEDURE — 1123F ACP DISCUSS/DSCN MKR DOCD: CPT | Performed by: UROLOGY

## 2023-11-27 PROCEDURE — 1036F TOBACCO NON-USER: CPT | Performed by: UROLOGY

## 2023-11-27 PROCEDURE — G8427 DOCREV CUR MEDS BY ELIG CLIN: HCPCS | Performed by: UROLOGY

## 2023-11-30 RX ORDER — OXYBUTYNIN CHLORIDE 10 MG/1
10 TABLET, EXTENDED RELEASE ORAL DAILY
Qty: 90 TABLET | Refills: 1 | Status: SHIPPED | OUTPATIENT
Start: 2023-11-30 | End: 2024-05-28

## 2023-11-30 NOTE — TELEPHONE ENCOUNTER
Patient returned call and will be using [facility] for this refill. [facility] only for something needed immediately.

## 2023-12-01 LAB
BACTERIA UR CULT: ABNORMAL
BACTERIA UR CULT: ABNORMAL
ORGANISM: ABNORMAL
ORGANISM: ABNORMAL

## 2024-01-16 ENCOUNTER — HOSPITAL ENCOUNTER (OUTPATIENT)
Dept: UROLOGY | Age: 89
Discharge: HOME | End: 2024-01-16
Payer: MEDICARE

## 2024-01-16 VITALS
HEART RATE: 92 BPM | SYSTOLIC BLOOD PRESSURE: 151 MMHG | WEIGHT: 150.1 LBS | BODY MASS INDEX: 25.01 KG/M2 | OXYGEN SATURATION: 99 % | RESPIRATION RATE: 16 BRPM | HEIGHT: 65 IN | TEMPERATURE: 97.5 F | DIASTOLIC BLOOD PRESSURE: 74 MMHG

## 2024-01-16 LAB
BILIRUBIN URINE: NEGATIVE
BLOOD URINE, POC: ABNORMAL
CHARACTER, URINE: ABNORMAL
COLOR, URINE: YELLOW
GLUCOSE URINE: NEGATIVE MG/DL
KETONES, URINE: NEGATIVE
LEUKOCYTE CLUMPS, URINE: ABNORMAL
NITRITE, URINE: POSITIVE
PH, URINE: 7 (ref 5–9)
PROTEIN, URINE: >= 300 MG/DL
SPECIFIC GRAVITY, URINE: 1.02 (ref 1–1.03)
UROBILINOGEN, URINE: 0.2 EU/DL (ref 0–1)

## 2024-01-16 PROCEDURE — 87186 SC STD MICRODIL/AGAR DIL: CPT

## 2024-01-16 PROCEDURE — 52000 CYSTOURETHROSCOPY: CPT

## 2024-01-16 PROCEDURE — 87086 URINE CULTURE/COLONY COUNT: CPT

## 2024-01-16 PROCEDURE — 87077 CULTURE AEROBIC IDENTIFY: CPT

## 2024-01-16 NOTE — OP NOTE
Cystoscopy    Operative Note    Patient:  Keely Ballard  MRN: [id number]  YOB: 1931    Date: 01/16/24  Surgeon: CECILIA HOLLOWAY MD  Anesthesia: Urojet Local  Indications:     Very complicated (review pt)  Had spt in past     Cystocele- poss rectocele. Cannot tolerate pessary (x 3)  Incontinence- worsening mixed incontinence. Failed oab meds.         Position: Dorsal Lithotomy  EBL: 0 ml    Findings:   The patient was prepped and draped in the usual sterile fashion.  The cystoscope was advanced through the urethra and into the bladder.  The bladder was thoroughly inspected and the following was noted:    Vagina: grade III cystocele, no rectocele noted  Residual Urine: severe. urine cloudy and concerning for infection  Urethra: normal appearing urethra with no masses, tenderness or lesions urethral hypermobility with leak noted. Leak significant after I reduced the prolapse  Bladder: no tumor noted visibility poor.  No obvious bladder diverticulum.  Moderate trabeculation noted.  Ureters: Orifices with normal configuration and location.      The cystoscope was removed.  The patient tolerated the procedure well.  Grade III cystocele with severe retention and overflow incontinence  Cannot do pessary  Needs abx --- send u for culture    Needs anterior colpo with mostafa. Pt understands this might not fully relieve symptoms. (45)

## 2024-01-16 NOTE — PROGRESS NOTES
[facility]  This procedure has been fully reviewed with the patient and written informed consent has been obtained.  1500 Procedure started with Dr. Riggs  1501 Procedure completed; patient tolerated well.  Dr. Riggs talked to patient in length about procedure findings.  Patient discharged.    PLAN     Urine culture sent.    Needs anterior colpo with mostafa. Pt understands this might not fully relieve symptoms. (45)

## 2024-01-16 NOTE — DISCHARGE INSTRUCTIONS
Discharge instructions: Cystoscopy  You May experience painful urination and see blood in the urine after your procedure.  This should resolve over time.      Pt ok to discharge home in good condition  No heavy lifting, >10 lbs for today  Pt should avoid strenuous activity for today  Pt should walk moderately at home  Pt ok to shower   Pt may resume diet as tolerated  Please call attending physician or hospital  with questions  Call or Present to ED if fever (> 101F), intractable nausea vomiting or pain.    Urine culture sent.    Office will call to schedule procedure.

## 2024-01-17 LAB
BACTERIA UR CULT: ABNORMAL
ORGANISM: ABNORMAL

## 2024-02-01 ENCOUNTER — OFFICE VISIT (OUTPATIENT)
Dept: UROLOGY | Age: 89
End: 2024-02-01
Payer: MEDICARE

## 2024-02-01 VITALS
WEIGHT: 152.5 LBS | DIASTOLIC BLOOD PRESSURE: 72 MMHG | SYSTOLIC BLOOD PRESSURE: 132 MMHG | HEIGHT: 65 IN | BODY MASS INDEX: 25.41 KG/M2

## 2024-02-01 DIAGNOSIS — N39.0 RECURRENT UTI: ICD-10-CM

## 2024-02-01 DIAGNOSIS — N81.11 MIDLINE CYSTOCELE: Primary | ICD-10-CM

## 2024-02-01 DIAGNOSIS — N39.46 MIXED INCONTINENCE: ICD-10-CM

## 2024-02-01 PROCEDURE — G8419 CALC BMI OUT NRM PARAM NOF/U: HCPCS | Performed by: UROLOGY

## 2024-02-01 PROCEDURE — G8484 FLU IMMUNIZE NO ADMIN: HCPCS | Performed by: UROLOGY

## 2024-02-01 PROCEDURE — 0509F URINE INCON PLAN DOCD: CPT | Performed by: UROLOGY

## 2024-02-01 PROCEDURE — 1036F TOBACCO NON-USER: CPT | Performed by: UROLOGY

## 2024-02-01 PROCEDURE — 1090F PRES/ABSN URINE INCON ASSESS: CPT | Performed by: UROLOGY

## 2024-02-01 PROCEDURE — 1123F ACP DISCUSS/DSCN MKR DOCD: CPT | Performed by: UROLOGY

## 2024-02-01 PROCEDURE — G8427 DOCREV CUR MEDS BY ELIG CLIN: HCPCS | Performed by: UROLOGY

## 2024-02-01 PROCEDURE — 99214 OFFICE O/P EST MOD 30 MIN: CPT | Performed by: UROLOGY

## 2024-02-01 RX ORDER — OMEPRAZOLE 20 MG/1
20 CAPSULE, DELAYED RELEASE ORAL DAILY
COMMUNITY
Start: 2023-12-08

## 2024-02-01 NOTE — PROGRESS NOTES
[facility] [place] [facility]  [facility] UROLOGY  [address]  [address]  [facility] [address]  Dept: [phone]  Dept Fax: [phone]  Loc: [phone]    [facility] Urology Office Note -     Patient:  Keely Ballard  YOB: 1931    The patient is a 89 y.o. female who presents today for evaluation of the following problems:   Chief Complaint   Patient presents with    Follow-up     Discuss poss surgery. Dr. Riggs saw on 01/16 for cysto    referred/consultation requested by Brittany Benton APRN - CNP.    History of Present Illness:    Incontinence  Likely mixed incontinence  Failed oab meds  Had SPT recently that was placed but not helpful. It's possible it was not in the correct position    prolapse  Since prolapse surgery in 2012 pt has had issues, possible fistula? Had mesh removed from rectum and pt developed worse issues after      Saw dr machado    Requested/reviewed records from Brittany Benton APRN - CNP office and/or outside physician/EMR    (Patient's old records have been requested, reviewed and pertinent findings summarized in today's note.)    Procedures Today: N/A      Last several PSA's:  No results found for: \"PSA\"    Last total testosterone:  No results found for: \"TESTOSTERONE\"    Urinalysis today:  No results found for this visit on 02/01/24.    Last BUN and creatinine:  Lab Results   Component Value Date    BUN 48 09/17/2021     Lab Results   Component Value Date    CREATININE 2.0 09/17/2021         Imaging Reviewed during this Office Visit:   GRZEGORZ STEPHENSON MD independently reviewed the images and verified the radiology reports from:    Patient was never admitted.    PAST MEDICAL, FAMILY AND SOCIAL HISTORY:  Past Medical History:   Diagnosis Date    Anxiety     Dysuria     GERD (gastroesophageal reflux disease)     Hypertension     Insomnia     Mixed hyperlipidemia     Osteoarthritis     Renal insufficiency      Past Surgical History:   Procedure

## 2024-02-06 ENCOUNTER — TELEPHONE (OUTPATIENT)
Dept: UROLOGY | Age: 89
End: 2024-02-06

## 2024-02-06 DIAGNOSIS — Z01.818 PRE-OP TESTING: ICD-10-CM

## 2024-02-06 DIAGNOSIS — N81.11 MIDLINE CYSTOCELE: Primary | ICD-10-CM

## 2024-02-06 DIAGNOSIS — N39.46 MIXED INCONTINENCE: ICD-10-CM

## 2024-02-06 NOTE — TELEPHONE ENCOUNTER
SURGERY SCHEDULING FORM   [address] [address]      Phone *[phone] *[phone]   Surgical Scheduling Direct Line Phone *[phone] Fax *[phone]      Keely Elio 5/12/1931 female    [address] [place] [address]   Marital Status:          Home Phone: [phone]      Cell Phone:    Telephone Information:   Mobile [phone]          Surgeon: Dr. Cotter    Surgery Date: 3/14/24       Time: 11:30 AM    Procedure: Cystoscopy, Anterior Colporrhaphy Cystocele    Diagnosis: Midline Cystocele    Important Medical History:  In Epic    Special Inst/Equip:     CPT Codes:    92082,43923  Latex Allergy: No     Cardiac Device:  No    Anesthesia:  General          Admission Type:  Same Day                        Admit Prior to Day of Surgery: No    Case Location:  Main OR            Preadmission Testing:  Phone Call          PAT Date and Time:______________________________________________________    PAT Confirmation #: ______________________________________________________    Post Op Visit: ___________________________________________________________    Need Preop Cardiac Clearance: No    Does Patient have Cardiologist/physician?     Brittany STEVENS-CNP to clear    Surgery Confirmation #: __________________________________________________    : ________________________   Date: __________________________     Insurance Company Name: Medicare                                          
DO NOT TAKE  FISH OIL, MOBIC, IBUPROFEN, MOTRIN-LIKE DRUGS AND ANY MULTIVITAMINS OR OVER THE COUNTER SUPPLEMENTS 14 DAYS PRIOR TO SURGERY.    HOLD ASPIRIN 5 DAYS PRIOR TO SURGERY    IF YOU TAKE GLUCOPHAGE, METFORMIN OR JANUMET, HOLD 2 DAYS PRIOR TO SURGERY    MUST HAVE AN ADULT OVER THE AGE OF 18 WITH YOU AT THE TIME OF THE DISCHARGE AND WITH YOU AT HOME AFTER THE PROCEDURE FOR 24 HOURS        Keely Ballard 5/12/1931     Surgical Physician: Dr. Cotter      You have been scheduled for the procedure marked below:      Surgery: Cystoscopy, Anterior Colporrhaphy Cystocele         Date: 3/14/24     Anesthesia: Anesthesiologist (General/Spinal)     Place of Service: [facility] Second Floor Same Day Surgery         Arrive to same day surgery at:  9:30 am  (Surgery time is subject to change)      INSTRUCTIONS AS MARKED BELOW:    1.  DO NOT eat or drink anything after midnight before surgery.  2.  We prefer you shower or bathe with an antibacterial soap (Dial) the morning of surgery.  3  Please bring a current medication list, photo ID and insurance card(s) with you  4. Okay to take Tylenol  5. Take blood pressure or heart medication as directed, if taken in the morning take with a small sip of water  6.The office will call you in 1-2 days after your procedure to schedule a follow up.    DATE SENSITIVE PRE OP TESTING:    TO AVOID YOUR SURGERY BEING CANCELLED DO ON THE DATE LISTED *WALK IN *NO APPOINTMENT.      DO THE PRE OP URINE CULTURE ON 3/1/24. ORDERS INCLUDED        Date: 2/6/2024    
Patient is scheduled for surgery with  on 3/14/24. Surgery consent to be done on arrival. Brittany ACE to clear. Patient states he/she does not follow with a cardiologist. Patient to do pre op fasting labs,ekg and chest on 2/8/24. Patient to do pre op urine culture on 3/1/24.. Surgery instructions gone over with patient verbally in the office or mailed to the patient.     Patient informed an adult over the age of 18 must be with them at the time of surgery, upon discharge and at home for 24 hours after the procedure.   
surgery ___Risk Unacceptable-Communication to Follow  Comments:_____________________________________________________  ________________________________________________________________________  Physician ___________________________  Date:_______________  Physician Printed Name:  _________________________    Fax  [phone]

## 2024-02-08 LAB
BASOPHILS ABSOLUTE: 0 /ΜL
BASOPHILS RELATIVE PERCENT: 0.1 %
BUN BLDV-MCNC: 39 MG/DL
CALCIUM SERPL-MCNC: 10.3 MG/DL
CHLORIDE BLD-SCNC: 103 MMOL/L
CO2: 31 MMOL/L
CREAT SERPL-MCNC: 1.6 MG/DL
EGFR: 30
EOSINOPHILS ABSOLUTE: 0.2 /ΜL
EOSINOPHILS RELATIVE PERCENT: 2.7 %
GLUCOSE BLD-MCNC: 110 MG/DL
HCT VFR BLD CALC: 36.5 % (ref 36–46)
HEMOGLOBIN: 11.4 G/DL (ref 12–16)
LYMPHOCYTES ABSOLUTE: 2.2 /ΜL
LYMPHOCYTES RELATIVE PERCENT: 28.3 %
MCH RBC QN AUTO: 31 PG
MCHC RBC AUTO-ENTMCNC: 31.2 G/DL
MCV RBC AUTO: 99.2 FL
MONOCYTES ABSOLUTE: 0.7 /ΜL
MONOCYTES RELATIVE PERCENT: 9.2 %
NEUTROPHILS ABSOLUTE: 4.6 /ΜL
NEUTROPHILS RELATIVE PERCENT: 59.4 %
PDW BLD-RTO: 14.4 %
PLATELET # BLD: 341 K/ΜL
PMV BLD AUTO: 8.6 FL
POTASSIUM SERPL-SCNC: 4.4 MMOL/L
RBC # BLD: 3.68 10^6/ΜL
SODIUM BLD-SCNC: 139 MMOL/L
WBC # BLD: 7.7 10^3/ML

## 2024-02-13 ENCOUNTER — TELEPHONE (OUTPATIENT)
Dept: UROLOGY | Age: 89
End: 2024-02-13

## 2024-02-13 RX ORDER — AMOXICILLIN AND CLAVULANATE POTASSIUM 875; 125 MG/1; MG/1
1 TABLET, FILM COATED ORAL 2 TIMES DAILY
Qty: 14 TABLET | Refills: 0 | Status: SHIPPED | OUTPATIENT
Start: 2024-02-13 | End: 2024-02-20

## 2024-02-13 NOTE — TELEPHONE ENCOUNTER
Patient advised of the urine results and augmentin was sent to the pharmacy. She voiced understanding.

## 2024-02-23 ENCOUNTER — PREP FOR PROCEDURE (OUTPATIENT)
Dept: UROLOGY | Age: 89
End: 2024-02-23

## 2024-02-23 RX ORDER — SODIUM CHLORIDE 0.9 % (FLUSH) 0.9 %
5-40 SYRINGE (ML) INJECTION PRN
Status: CANCELLED | OUTPATIENT
Start: 2024-02-23

## 2024-02-23 RX ORDER — SODIUM CHLORIDE 9 MG/ML
INJECTION, SOLUTION INTRAVENOUS PRN
Status: CANCELLED | OUTPATIENT
Start: 2024-02-23

## 2024-02-23 RX ORDER — SODIUM CHLORIDE 0.9 % (FLUSH) 0.9 %
5-40 SYRINGE (ML) INJECTION EVERY 12 HOURS SCHEDULED
Status: CANCELLED | OUTPATIENT
Start: 2024-02-23

## 2024-02-28 ENCOUNTER — TELEPHONE (OUTPATIENT)
Dept: UROLOGY | Age: 89
End: 2024-02-28

## 2024-02-28 NOTE — TELEPHONE ENCOUNTER
Patient is scheduled for a Cystoscopy, Anterior Colporrhapy Cystocele with Dr Cotter on 3/14/24. She has been cleared by her Primary Care Doctor but she is asking for clearance from her Nephrologist also.

## 2024-03-04 ENCOUNTER — TELEPHONE (OUTPATIENT)
Dept: UROLOGY | Age: 89
End: 2024-03-04

## 2024-03-04 DIAGNOSIS — N30.00 ACUTE CYSTITIS WITHOUT HEMATURIA: Primary | ICD-10-CM

## 2024-03-04 RX ORDER — SULFAMETHOXAZOLE AND TRIMETHOPRIM 800; 160 MG/1; MG/1
1 TABLET ORAL 2 TIMES DAILY
Qty: 14 TABLET | Refills: 0 | Status: SHIPPED | OUTPATIENT
Start: 2024-03-04 | End: 2024-03-11

## 2024-03-04 NOTE — TELEPHONE ENCOUNTER
Patient advised of the urine results and bactrim was sent to the pharmacy. She voiced understanding.

## 2024-03-06 RX ORDER — LANOLIN AND PETROLATUM 136.4; 469.9 MG/G; MG/G
1 OINTMENT TOPICAL PRN
COMMUNITY

## 2024-03-06 NOTE — FLOWSHEET NOTE
Follow all instructions given by your physician  Do not eat or drink anything after midnight prior to surgery(includes water, chewing gum, mints and ice chips)  Sips of water am of surgery with allowed medications  Do not use chewing tobacco after midnight prior to surgery  May brush teeth do not swallow water  Do not smoke, drink alcoholic beverages or use any illicit drugs for 24 hours prior to surgery  Bring insurance info and photo ID  Bring pertinent paperwork with you from Doctor or surgeons's office  Wear clean comfortable, loose-fitting clothing  No make-up, nail polish, jewelry, piercings, or contact lenses to be worn day of surgery  No glue on dentures morning of surgery; you will be asked to remove them for surgery. Case for glasses.  Shower the night before and the morning of surgery with cleansing soap provided or a liquid antibacterial soap, dry with new fresh clean towel after each shower, no lotions, creams or powder.  Clean sheets and pillowcase on bed night before surgery  Bring medications in original bottles, Bring rescue inhalers with you  Bring CPAP/BIPAP machine if you have one ( you may be charged if one is needed in recovery room ) no pacemaker no    Our pharmacy has a Meds to Beds program where they will deliver any new prescriptions you may have to your room before you leave. Our Pharmacy will clear it through your insurance; for example (same co pay). This enables you to take your new RX as soon as you need when you get home and avoids stop/wait delays on the way home.  Please have a form of payment with you and have someone designated as your Pharmacy contact with their phone # as you may not feel well or still be under the influence of anesthesia.    Please refer to the SSI-Surgical Site Infection Flyer you hopefully received in the mail-together we can prevent infections; signs and symptoms reviewed.  When discharged be sure you understand how to care for your wound and that you have

## 2024-03-14 ENCOUNTER — ANESTHESIA EVENT (OUTPATIENT)
Dept: OPERATING ROOM | Age: 89
End: 2024-03-14
Payer: MEDICARE

## 2024-03-14 ENCOUNTER — HOSPITAL ENCOUNTER (OUTPATIENT)
Age: 89
Setting detail: OUTPATIENT SURGERY
Discharge: HOME | End: 2024-03-14
Attending: UROLOGY | Admitting: UROLOGY
Payer: MEDICARE

## 2024-03-14 ENCOUNTER — ANESTHESIA (OUTPATIENT)
Dept: OPERATING ROOM | Age: 89
End: 2024-03-14
Payer: MEDICARE

## 2024-03-14 VITALS
BODY MASS INDEX: 24.99 KG/M2 | OXYGEN SATURATION: 99 % | DIASTOLIC BLOOD PRESSURE: 67 MMHG | HEART RATE: 83 BPM | WEIGHT: 150 LBS | RESPIRATION RATE: 20 BRPM | TEMPERATURE: 96.5 F | SYSTOLIC BLOOD PRESSURE: 146 MMHG | HEIGHT: 65 IN

## 2024-03-14 DIAGNOSIS — G89.18 POST-OP PAIN: Primary | ICD-10-CM

## 2024-03-14 PROCEDURE — 7100000000 HC PACU RECOVERY - FIRST 15 MIN: Performed by: UROLOGY

## 2024-03-14 PROCEDURE — 7100000010 HC PHASE II RECOVERY - FIRST 15 MIN: Performed by: UROLOGY

## 2024-03-14 PROCEDURE — 2709999900 HC NON-CHARGEABLE SUPPLY: Performed by: UROLOGY

## 2024-03-14 PROCEDURE — 3600000003 HC SURGERY LEVEL 3 BASE: Performed by: UROLOGY

## 2024-03-14 PROCEDURE — 7100000011 HC PHASE II RECOVERY - ADDTL 15 MIN: Performed by: UROLOGY

## 2024-03-14 PROCEDURE — 3600000013 HC SURGERY LEVEL 3 ADDTL 15MIN: Performed by: UROLOGY

## 2024-03-14 PROCEDURE — 7100000001 HC PACU RECOVERY - ADDTL 15 MIN: Performed by: UROLOGY

## 2024-03-14 PROCEDURE — 2500000003 HC RX 250 WO HCPCS: Performed by: NURSE ANESTHETIST, CERTIFIED REGISTERED

## 2024-03-14 PROCEDURE — 2500000003 HC RX 250 WO HCPCS: Performed by: UROLOGY

## 2024-03-14 PROCEDURE — 2580000003 HC RX 258: Performed by: UROLOGY

## 2024-03-14 PROCEDURE — 3700000000 HC ANESTHESIA ATTENDED CARE: Performed by: UROLOGY

## 2024-03-14 PROCEDURE — 6360000002 HC RX W HCPCS: Performed by: NURSE ANESTHETIST, CERTIFIED REGISTERED

## 2024-03-14 PROCEDURE — 6360000002 HC RX W HCPCS: Performed by: UROLOGY

## 2024-03-14 PROCEDURE — 3700000001 HC ADD 15 MINUTES (ANESTHESIA): Performed by: UROLOGY

## 2024-03-14 RX ORDER — HYDROCODONE BITARTRATE AND ACETAMINOPHEN 5; 325 MG/1; MG/1
1 TABLET ORAL ONCE
Status: DISCONTINUED | OUTPATIENT
Start: 2024-03-14 | End: 2024-03-14 | Stop reason: HOSPADM

## 2024-03-14 RX ORDER — ONDANSETRON 2 MG/ML
INJECTION INTRAMUSCULAR; INTRAVENOUS PRN
Status: DISCONTINUED | OUTPATIENT
Start: 2024-03-14 | End: 2024-03-14 | Stop reason: SDUPTHER

## 2024-03-14 RX ORDER — NALOXONE HYDROCHLORIDE 0.4 MG/ML
INJECTION, SOLUTION INTRAMUSCULAR; INTRAVENOUS; SUBCUTANEOUS PRN
Status: DISCONTINUED | OUTPATIENT
Start: 2024-03-14 | End: 2024-03-14 | Stop reason: HOSPADM

## 2024-03-14 RX ORDER — FENTANYL CITRATE 50 UG/ML
INJECTION, SOLUTION INTRAMUSCULAR; INTRAVENOUS PRN
Status: DISCONTINUED | OUTPATIENT
Start: 2024-03-14 | End: 2024-03-14 | Stop reason: SDUPTHER

## 2024-03-14 RX ORDER — SODIUM CHLORIDE 0.9 % (FLUSH) 0.9 %
5-40 SYRINGE (ML) INJECTION PRN
Status: DISCONTINUED | OUTPATIENT
Start: 2024-03-14 | End: 2024-03-14 | Stop reason: HOSPADM

## 2024-03-14 RX ORDER — LIDOCAINE HYDROCHLORIDE 20 MG/ML
INJECTION, SOLUTION INFILTRATION; PERINEURAL PRN
Status: DISCONTINUED | OUTPATIENT
Start: 2024-03-14 | End: 2024-03-14 | Stop reason: SDUPTHER

## 2024-03-14 RX ORDER — PHENAZOPYRIDINE HYDROCHLORIDE 200 MG/1
200 TABLET, FILM COATED ORAL 3 TIMES DAILY PRN
Qty: 9 TABLET | Refills: 0 | Status: SHIPPED | OUTPATIENT
Start: 2024-03-14

## 2024-03-14 RX ORDER — FENTANYL CITRATE 50 UG/ML
25 INJECTION, SOLUTION INTRAMUSCULAR; INTRAVENOUS EVERY 5 MIN PRN
Status: DISCONTINUED | OUTPATIENT
Start: 2024-03-14 | End: 2024-03-14 | Stop reason: HOSPADM

## 2024-03-14 RX ORDER — CLINDAMYCIN PHOSPHATE 20 MG/G
CREAM VAGINAL PRN
Status: DISCONTINUED | OUTPATIENT
Start: 2024-03-14 | End: 2024-03-14 | Stop reason: ALTCHOICE

## 2024-03-14 RX ORDER — SODIUM CHLORIDE 9 MG/ML
INJECTION, SOLUTION INTRAVENOUS PRN
Status: DISCONTINUED | OUTPATIENT
Start: 2024-03-14 | End: 2024-03-14 | Stop reason: HOSPADM

## 2024-03-14 RX ORDER — DOXYCYCLINE 100 MG/1
100 CAPSULE ORAL 2 TIMES DAILY
Qty: 14 CAPSULE | Refills: 0 | Status: SHIPPED | OUTPATIENT
Start: 2024-03-14

## 2024-03-14 RX ORDER — FENTANYL CITRATE 50 UG/ML
50 INJECTION, SOLUTION INTRAMUSCULAR; INTRAVENOUS EVERY 5 MIN PRN
Status: DISCONTINUED | OUTPATIENT
Start: 2024-03-14 | End: 2024-03-14 | Stop reason: HOSPADM

## 2024-03-14 RX ORDER — SODIUM CHLORIDE 0.9 % (FLUSH) 0.9 %
5-40 SYRINGE (ML) INJECTION EVERY 12 HOURS SCHEDULED
Status: DISCONTINUED | OUTPATIENT
Start: 2024-03-14 | End: 2024-03-14 | Stop reason: HOSPADM

## 2024-03-14 RX ORDER — LIDOCAINE HYDROCHLORIDE AND EPINEPHRINE 10; 10 MG/ML; UG/ML
INJECTION, SOLUTION INFILTRATION; PERINEURAL PRN
Status: DISCONTINUED | OUTPATIENT
Start: 2024-03-14 | End: 2024-03-14 | Stop reason: ALTCHOICE

## 2024-03-14 RX ORDER — DEXAMETHASONE SODIUM PHOSPHATE 4 MG/ML
INJECTION, SOLUTION INTRA-ARTICULAR; INTRALESIONAL; INTRAMUSCULAR; INTRAVENOUS; SOFT TISSUE PRN
Status: DISCONTINUED | OUTPATIENT
Start: 2024-03-14 | End: 2024-03-14 | Stop reason: SDUPTHER

## 2024-03-14 RX ORDER — HYDROCODONE BITARTRATE AND ACETAMINOPHEN 5; 325 MG/1; MG/1
1 TABLET ORAL EVERY 6 HOURS PRN
Qty: 12 TABLET | Refills: 0 | Status: SHIPPED | OUTPATIENT
Start: 2024-03-14 | End: 2024-03-17

## 2024-03-14 RX ADMIN — DEXAMETHASONE SODIUM PHOSPHATE 8 MG: 4 INJECTION, SOLUTION INTRAMUSCULAR; INTRAVENOUS at 11:05

## 2024-03-14 RX ADMIN — PROPOFOL 20 MG: 10 INJECTION, EMULSION INTRAVENOUS at 11:10

## 2024-03-14 RX ADMIN — ONDANSETRON 4 MG: 2 INJECTION INTRAMUSCULAR; INTRAVENOUS at 11:05

## 2024-03-14 RX ADMIN — LIDOCAINE HYDROCHLORIDE 60 MG: 20 INJECTION, SOLUTION INFILTRATION; PERINEURAL at 11:05

## 2024-03-14 RX ADMIN — FENTANYL CITRATE 25 MCG: 50 INJECTION, SOLUTION INTRAMUSCULAR; INTRAVENOUS at 11:54

## 2024-03-14 RX ADMIN — PROPOFOL 80 MG: 10 INJECTION, EMULSION INTRAVENOUS at 11:05

## 2024-03-14 RX ADMIN — WATER 2000 MG: 1 INJECTION INTRAMUSCULAR; INTRAVENOUS; SUBCUTANEOUS at 11:12

## 2024-03-14 RX ADMIN — SODIUM CHLORIDE: 9 INJECTION, SOLUTION INTRAVENOUS at 10:40

## 2024-03-14 RX ADMIN — FENTANYL CITRATE 25 MCG: 50 INJECTION, SOLUTION INTRAMUSCULAR; INTRAVENOUS at 11:11

## 2024-03-14 RX ADMIN — FENTANYL CITRATE 25 MCG: 50 INJECTION, SOLUTION INTRAMUSCULAR; INTRAVENOUS at 11:05

## 2024-03-14 ASSESSMENT — PAIN DESCRIPTION - LOCATION: LOCATION: HEAD

## 2024-03-14 ASSESSMENT — PAIN DESCRIPTION - DESCRIPTORS: DESCRIPTORS: ACHING;DISCOMFORT

## 2024-03-14 ASSESSMENT — PAIN - FUNCTIONAL ASSESSMENT
PAIN_FUNCTIONAL_ASSESSMENT: NONE - DENIES PAIN
PAIN_FUNCTIONAL_ASSESSMENT: 0-10

## 2024-03-14 ASSESSMENT — PAIN SCALES - GENERAL
PAINLEVEL_OUTOF10: 3
PAINLEVEL_OUTOF10: 5
PAINLEVEL_OUTOF10: 5

## 2024-03-14 NOTE — PROGRESS NOTES
Pt returned to [facility] room 10. Vitals and assessment as charted. 0.9 infusing, @500ml to count from PACU. Pt has jello and water. Family at the bedside. Pt and family verbalized understanding of discharge criteria and call light use. Call light in reach.

## 2024-03-14 NOTE — DISCHARGE INSTRUCTIONS
General Discharge Instructions:      No heavy lifting, >20 lbs for 4-6 week or till cleared by surgeon  Pt should avoid strenuous activity for 4-6 weeks  Pt should walk moderately at home  Pt ok to shower in 24 hrs after discharge while keeping incision clean / dry.  Pt may resume diet as tolerated  Pt should take Rx as directed  No driving while on narcotics  Please call attending physician or hospital  with questions  Call or Present to ED if fever (> 101F), intractable nausea vomiting or pain, if incisions become red/swollen or drain pus/fluid, or if calves become red swollen and tender.  Follow up will be arranged  Resume blood thinners in 5 days   Remove vaginal packing in 24 hours, if falls out before that it okay  Nothing in vagina for 4 weeks  No soaking in tub or swimming for 2 weeks  Normal to have spotting for 1-2 weeks, wear a pad as needed

## 2024-03-14 NOTE — PROGRESS NOTES
Pt admitted to hospitals room 10 and oriented to unit. SCD sleeves applied. Nares swabbed. Pt verbalized permission for first name, last initial and physicians name on white board. SDS board and discharge criteria explained, pt and family verbalized understanding. Pt denies thoughts of harming self or others. Call light in reach. Family at the bedside.  [place] [phone]

## 2024-03-14 NOTE — ANESTHESIA POSTPROCEDURE EVALUATION
Stable    Post-op Pain:  Adequate analgesia    Post-op Assessment:  No apparent anesthetic complications    Additional Follow-Up / Treatment / Comment:  None    Raymond [place],   March 14, 2024   1:35 PM      No notable events documented.

## 2024-03-14 NOTE — H&P
History and Physical    Patient:  Keely Ballard  MRN: [id number]  YOB: 1931    CHIEF COMPLAINT:  POP    HISTORY OF PRESENT ILLNESS:   The patient is a 89 y.o. female who presents with as above, here for surgery    Patient's old records, notes and chart reviewed and summarized above.     Past Medical History:    Past Medical History:   Diagnosis Date    Anxiety     \"can't go to sleep\"    Dysuria     GERD (gastroesophageal reflux disease)     Hypertension     Insomnia     Mixed hyperlipidemia     Osteoarthritis     Renal insufficiency        Past Surgical History:    Past Surgical History:   Procedure Laterality Date    COLONOSCOPY      HYSTERECTOMY (CERVIX STATUS UNKNOWN)      RECTAL SURGERY      SIGMOIDOSCOPY      SUPRAPUBIC CATHETER  10/04/2023    Patient states was removed approximately 2 days later because it was in the wrong place.    TUBAL LIGATION      UPPER GASTROINTESTINAL ENDOSCOPY       Medications Prior to Admission:    Prior to Admission medications    Medication Sig Start Date End Date Taking? Authorizing Provider   lanolin-petrolatum (A+D) ointment Apply 1 application  topically as needed for Dry Skin   Yes Ella Sethi MD   omeprazole (PRILOSEC) 20 MG delayed release capsule Take 1 capsule by mouth daily 12/8/23   Ella Sethi MD   oxybutynin (DITROPAN XL) 10 MG extended release tablet Take 1 tablet by mouth daily 11/30/23 5/28/24  Jeanie Bernard DO   ferrous sulfate (IRON 325) 325 (65 Fe) MG tablet Take 1 tablet by mouth 2 times daily  Patient not taking: Reported on 3/6/2024 12/27/21   Soham Wang MD   calcium carbonate-vitamin D 600-200 MG-UNIT TABS Take 1 tablet by mouth 2 times daily    Ella Sethi MD   amitriptyline (ELAVIL) 50 MG tablet TAKE 1 TABLET AT BEDTIME 7/21/21   Ella Sethi MD   aspirin 81 MG chewable tablet Take 1 tablet by mouth nightly    Ella Sethi MD   magnesium (MAGNESIUM-OXIDE) 250 MG TABS tablet Take 1  negative  Respiratory: negative  Cardiovascular: negative  Gastrointestinal: negative  Genitourinary: see HPI  Musculoskeletal: negative  Skin: negative   Neurological: negative  Hematological/Lymphatic: negative  Psychological: negative    Physical Exam:      Patient Vitals for the past 24 hrs:   BP Temp Temp src Pulse Resp SpO2 Height Weight   03/14/24 0950 (!) 145/63 96.8 °F (36 °C) Tympanic 85 20 98 % 1.651 m (5' 5\") 68 kg (150 lb)     Constitutional: Patient in no acute distress;   Neuro: alert and oriented to person place and time.    Psych: Mood and affect normal.  Skin: Normal  Lungs: Respiratory effort normal, CTA  Cardiovascular:  Normal peripheral pulses; no murmur  Abdomen: Soft, non-tender, non-distended with no CVA, flank pain, hepatosplenomegaly or hernia.  Kidneys normal.  Bladder non-tender and not distended.        LABS:   No results for input(s): \"WBC\", \"HGB\", \"HCT\", \"MCV\", \"PLT\" in the last 72 hours.  No results for input(s): \"NA\", \"K\", \"CL\", \"CO2\", \"PHOS\", \"BUN\", \"CREATININE\", \"CA\" in the last 72 hours.  No results found for: \"PSA\"        Urinalysis: No results for input(s): \"COLORU\", \"PHUR\", \"LABCAST\", \"WBCUA\", \"RBCUA\", \"MUCUS\", \"TRICHOMONAS\", \"YEAST\", \"BACTERIA\", \"CLARITYU\", \"SPECGRAV\", \"LEUKOCYTESUR\", \"UROBILINOGEN\", \"BILIRUBINUR\", \"BLOODU\" in the last 72 hours.    Invalid input(s): \"NITRATE\", \"GLUCOSEUKETONESUAMORPHOUS\"     -----------------------------------------------------------------      Assessment and Plan   Impression:    Patient Active Problem List   Diagnosis    Anxiety    Arthropathy of multiple sites    Essential hypertension    GERD (gastroesophageal reflux disease)    Hyperlipidemia    Osteoarthritis of left knee    Osteoporosis    Insomnia    Renal insufficiency syndrome       Plan:   Risks: I discussed all the risks, benefits, alternatives and possible complications or surgery as well as expectations and post-op recovery.      Consent obtained; Cystoscopy Anterior

## 2024-03-14 NOTE — ANESTHESIA PRE PROCEDURE
Department of Anesthesiology  Preprocedure Note       Name:  Keely Ballard   Age:  89 y.o.  :  1931                                          MRN:  [id number]         Date:  3/14/2024      Surgeon: Surgeon(s):  Dav Cotter MD    Procedure: Procedure(s):  Cystoscopy Anterior Colporrhaphy Cystocele    Medications prior to admission:   Prior to Admission medications    Medication Sig Start Date End Date Taking? Authorizing Provider   lanolin-petrolatum (A+D) ointment Apply 1 application  topically as needed for Dry Skin   Yes Ella Sethi MD   omeprazole (PRILOSEC) 20 MG delayed release capsule Take 1 capsule by mouth daily 23   Ella Sethi MD   oxybutynin (DITROPAN XL) 10 MG extended release tablet Take 1 tablet by mouth daily 23  Jeanie Bernard DO   ferrous sulfate (IRON 325) 325 (65 Fe) MG tablet Take 1 tablet by mouth 2 times daily  Patient not taking: Reported on 3/6/2024 12/27/21   Soham Wang MD   calcium carbonate-vitamin D 600-200 MG-UNIT TABS Take 1 tablet by mouth 2 times daily    Ella Sethi MD   amitriptyline (ELAVIL) 50 MG tablet TAKE 1 TABLET AT BEDTIME 21   Ella Sethi MD   aspirin 81 MG chewable tablet Take 1 tablet by mouth nightly    Ella Sethi MD   magnesium (MAGNESIUM-OXIDE) 250 MG TABS tablet Take 1 tablet by mouth 2 times daily    Ella Sethi MD   metoprolol succinate (TOPROL XL) 25 MG extended release tablet TAKE 1 TABLET BY MOUTH ONCE DAILY 21   Ella Sethi MD   niacin 500 MG extended release capsule Take 1 capsule by mouth 2 times daily    Ella Sethi MD   omeprazole (PRILOSEC OTC) 20 MG tablet Take 1 tablet by mouth daily Taking 3 times per week temporarily    Ella Sethi MD   simvastatin (ZOCOR) 10 MG tablet TAKE 1 TABLET EVERY EVENINGAT 6PM 21   Ella Sethi MD   acetaminophen (TYLENOL) 325 MG tablet Take 2 tablets by mouth every 6 hours as

## 2024-03-14 NOTE — PROGRESS NOTES
1219: pt arrives to pacu. Pt on room air. Pt responds to verbal stimulation. VSS. Respirations unlabored. Pt denies pain. Draper warmer placed on pt   1225: pt resting in bed with eyes closed   1231: pt resting in bed. Pt denies pain   1238: pt resting in bed   1249: pt meets discharge criteria from pacu. Pt transported to [facility]

## 2024-03-20 NOTE — OP NOTE
Operative Note      Patient: Keely Ballard  YOB: 1931  MRN: [id number]    Date of Procedure: 3/14/2024    Pre-Op Diagnosis Codes:     * Midline cystocele [N81.11]    Post-Op Diagnosis: Same       Procedure(s):  Cystoscopy Anterior Colporrhaphy Cystocele    Surgeon(s):  Dav Cotter MD    Assistant:   * No surgical staff found *    Anesthesia: General    Estimated Blood Loss (mL): Minimal    Complications: None    Specimens:   * No specimens in log *    Implants:  * No implants in log *      Drains: * No LDAs found *            Detailed Description of Procedure:        NARRATIVE OF THE PROCEDURE:  After informed consent was reviewed in the preoperative area, the patient was taken back to the operating room and transferred to the operating table in the supine position. EPC cuffs were placed and the machine was turned on. Anesthesia was induced and antibiotics were given. She was placed in a modified dorsal lithotomy position, sterilely prepped and draped in a standard fashion. We placed a Smith catheter in her bladder and her bladder was drained. Bilateral labial retraction sutures were placed with 2-0 silk. Then we used 10 mL total of 0.5% marcaine with epinephrine and injected the anterior vaginal mucosa to hydro-dissect the tissue plane. We used a 15 blade scalpel to make a small incision in the vaginal mucosa about 1 cm posterior to the urethral meatus, and extended this 5 cm posteriorly. Metzenbaum scissors and blunt dissection were then used to dissect the mucosa off of the cystocele and cut the vaginal mucosa in the midline. The cut edges were held and splayed laterally with a series of Allis clamps. The bladder was dissected away along the lateral edges with a combination of sharp and blunt dissection, exposing the vesicovaginal space. A series of #2-0 Vicryl interrupted sutures were then placed sequentially along the lateral folds of the vesicovaginal space and brought together to tuck the

## 2024-04-04 ENCOUNTER — TELEPHONE (OUTPATIENT)
Dept: UROLOGY | Age: 89
End: 2024-04-04

## 2024-04-04 DIAGNOSIS — R30.0 DYSURIA: Primary | ICD-10-CM

## 2024-04-04 NOTE — TELEPHONE ENCOUNTER
Patient has some occasional burning at the end of the stream. She denies urgency, frequency, or pressure. She does not know if she has an infection. Orders faxed to [facility] [phone]

## 2024-04-10 ENCOUNTER — TELEPHONE (OUTPATIENT)
Dept: UROLOGY | Age: 89
End: 2024-04-10

## 2024-04-12 RX ORDER — CIPROFLOXACIN 250 MG/1
250 TABLET, FILM COATED ORAL 2 TIMES DAILY
Qty: 14 TABLET | Refills: 0 | Status: SHIPPED | OUTPATIENT
Start: 2024-04-12 | End: 2024-04-19

## 2024-04-12 NOTE — TELEPHONE ENCOUNTER
Patient advised of the urine results and cipro was sent to the pharmacy. She voiced understanding.

## 2024-04-23 ENCOUNTER — TELEPHONE (OUTPATIENT)
Dept: UROLOGY | Age: 89
End: 2024-04-23

## 2024-04-23 DIAGNOSIS — R30.0 DYSURIA: Primary | ICD-10-CM

## 2024-04-23 DIAGNOSIS — N39.0 RECURRENT UTI: ICD-10-CM

## 2024-04-23 NOTE — TELEPHONE ENCOUNTER
Check urine for infection    She is s/p     Follow-up as scheduled Cystoscopy Anterior Colporrhaphy Cystocele with Dr. Cotter on 3/14/2024. Monitor for discharge, bleeding, and incomplete emptying.    The patient should go to the ED should they develop fever, chills, nausea, vomiting, chest pain, SOB, calf pain, feelings of incomplete emptying, or should they otherwise feel they need evaluated

## 2024-04-23 NOTE — TELEPHONE ENCOUNTER
Patient denies having any problems with emptying the bladder, bleeding or discharge. She will notify the office of any questions or concerns.

## 2024-04-23 NOTE — TELEPHONE ENCOUNTER
Patient has pain at the end of the stream at different intervals. C/o incontinence at night and occasionally during the day.     She does not know if the infection has cleared up and will go to the lab tomorrow    Follow up on 05/02/2024    Orders faxed to [email or web address] to check a urine

## 2024-05-01 ENCOUNTER — OFFICE VISIT (OUTPATIENT)
Dept: NEPHROLOGY | Age: 89
End: 2024-05-01
Payer: MEDICARE

## 2024-05-01 VITALS
OXYGEN SATURATION: 98 % | HEIGHT: 65 IN | DIASTOLIC BLOOD PRESSURE: 68 MMHG | BODY MASS INDEX: 24.99 KG/M2 | HEART RATE: 86 BPM | SYSTOLIC BLOOD PRESSURE: 144 MMHG | WEIGHT: 150 LBS

## 2024-05-01 DIAGNOSIS — E87.5 HYPERKALEMIA: ICD-10-CM

## 2024-05-01 DIAGNOSIS — N28.1 RENAL CYST: ICD-10-CM

## 2024-05-01 DIAGNOSIS — N18.32 CHRONIC KIDNEY DISEASE, STAGE 3B (HCC): Primary | ICD-10-CM

## 2024-05-01 DIAGNOSIS — I12.9 HYPERTENSIVE RENAL DISEASE, STAGE 1 THROUGH STAGE 4 OR UNSPECIFIED CHRONIC KIDNEY DISEASE: ICD-10-CM

## 2024-05-01 PROCEDURE — G8420 CALC BMI NORM PARAMETERS: HCPCS | Performed by: INTERNAL MEDICINE

## 2024-05-01 PROCEDURE — 1123F ACP DISCUSS/DSCN MKR DOCD: CPT | Performed by: INTERNAL MEDICINE

## 2024-05-01 PROCEDURE — G8427 DOCREV CUR MEDS BY ELIG CLIN: HCPCS | Performed by: INTERNAL MEDICINE

## 2024-05-01 PROCEDURE — 1090F PRES/ABSN URINE INCON ASSESS: CPT | Performed by: INTERNAL MEDICINE

## 2024-05-01 PROCEDURE — 99213 OFFICE O/P EST LOW 20 MIN: CPT | Performed by: INTERNAL MEDICINE

## 2024-05-01 PROCEDURE — 1036F TOBACCO NON-USER: CPT | Performed by: INTERNAL MEDICINE

## 2024-05-01 NOTE — PROGRESS NOTES
amitriptyline (ELAVIL) 50 MG tablet TAKE 1 TABLET AT BEDTIME      aspirin 81 MG chewable tablet Take 1 tablet by mouth nightly      magnesium (MAGNESIUM-OXIDE) 250 MG TABS tablet Take 1 tablet by mouth 2 times daily      metoprolol succinate (TOPROL XL) 25 MG extended release tablet TAKE 1 TABLET BY MOUTH ONCE DAILY      niacin 500 MG extended release capsule Take 1 capsule by mouth 2 times daily      omeprazole (PRILOSEC OTC) 20 MG tablet Take 1 tablet by mouth daily Taking 3 times per week temporarily      simvastatin (ZOCOR) 10 MG tablet TAKE 1 TABLET EVERY EVENINGAT 6PM      acetaminophen (TYLENOL) 325 MG tablet Take 2 tablets by mouth every 6 hours as needed for Pain      Multiple Vitamins-Minerals (ONE-A-DAY 50 PLUS PO) Take 1 tablet by mouth daily      Omega-3 Fatty Acids (OMEGA-3 FISH OIL PO) Take by mouth daily       No current facility-administered medications for this visit.        Laboratory & Diagnostics:  US: R 8.2,L 10.7 cm, 1.7 cm Left kidney cyst  Sept 2020: Creat 1.4  Feb 2021: Creat 1.5  Aug 2021: Creat 3.0 and now down to 2.5    Sept 2021: Iron saturation 20%, Ferritin 44  UPCR 500 mg/g, UA: trace protein, 2+ blood, 6-10 RBC  K 4.9, creat 2.0  Sept 2021: hb 8.8, Plt 436, Vit D 50.6, PTH 30,  mg/g  OCt 2021: Hb 9.8  Sept 2021: K 4.9, Creat 1.7, phos 4.7, Hb 10.4, Ferritin 42, saturation 31%  Oct 2022: Creat 1.59, K 4.8, Ca 9.4, Hb 10.8, Percent iron 25%, Ferritin 93, PTH 47  Oct 2023: K 5.9, creat 1.54, Hb  10.6, Hct 33, phos 4.1, Mg 2.1  April 2024: Hb 11.4, PTH 55, K 5.1, Creat 1.5     Impression/Plan:   1. CKD IIIb: She was on diclofenac BID for over 15+ years. Other risk factors include HTN and senile nephrosclerosis. Doing well overall.   2. Mild Hyperkalemia: monitor.   3. Excessive use of NSAIDs: stopped Aug 2021  4. HTN: on Toprol, stable  5. Hx remote smoking  6. Arthritis: doing better, not taking any NSAIDs  6. Anemia in CKD 3b: on iron tablets. Hb is improving/stable. Check iron

## 2024-05-02 ENCOUNTER — OFFICE VISIT (OUTPATIENT)
Dept: UROLOGY | Age: 89
End: 2024-05-02

## 2024-05-02 VITALS
SYSTOLIC BLOOD PRESSURE: 120 MMHG | WEIGHT: 150 LBS | DIASTOLIC BLOOD PRESSURE: 70 MMHG | BODY MASS INDEX: 24.99 KG/M2 | HEIGHT: 65 IN

## 2024-05-02 DIAGNOSIS — N39.46 MIXED INCONTINENCE: ICD-10-CM

## 2024-05-02 DIAGNOSIS — N81.11 MIDLINE CYSTOCELE: Primary | ICD-10-CM

## 2024-05-02 LAB — POST VOID RESIDUAL (PVR): 28 ML

## 2024-05-02 RX ORDER — SOLIFENACIN SUCCINATE 5 MG/1
5 TABLET, FILM COATED ORAL DAILY
Qty: 90 TABLET | Refills: 3 | Status: SHIPPED | OUTPATIENT
Start: 2024-05-02

## 2024-05-02 RX ORDER — POLYETHYLENE GLYCOL 3350 17 G/17G
17 POWDER, FOR SOLUTION ORAL DAILY
Qty: 1530 G | Refills: 1 | COMMUNITY
Start: 2024-05-02 | End: 2024-10-29

## 2024-05-02 ASSESSMENT — ENCOUNTER SYMPTOMS
NAUSEA: 0
ABDOMINAL PAIN: 0
BACK PAIN: 0
VOMITING: 0

## 2024-05-02 NOTE — PROGRESS NOTES
[facility] [place] [facility]  [facility] UROLOGY  [address]  [facility] [address]  Dept: [phone]  Loc: [phone]    Visit Date: 5/2/2024        HPI:     Keely Ballard is a 89 y.o. female who presents today for:  Chief Complaint   Patient presents with    Follow-up       HPI  Pt seen in follow up after recent surgery.     Pt has a hx of POP with prior surgery in 2012.   Recent recurrence/worsening and unable to tolerate pessary.    Worsened Mixed incontinence that failed medication therapy     Underwent cystoscopy, anterior colporrhaphy cystocele on 3/14/24 by Dr. Cotter.      Reports recently having some trouble with constipation.  Tried some miralax with improvement.      Reports she is very happy with results following surgery.  Notes it \"is wonderful\" to have relief of the pressure she was having previously.  Notes now she occasionally has some some discomfort in her bladder right after voiding.  Culture from 4/24/24 with mixed growth.  No dysuria.  Continues with mixed incontinence.     Current Outpatient Medications   Medication Sig Dispense Refill    doxycycline monohydrate (MONODOX) 100 MG capsule Take 1 capsule by mouth 2 times daily 14 capsule 0    phenazopyridine (PYRIDIUM) 200 MG tablet Take 1 tablet by mouth 3 times daily as needed for Pain 9 tablet 0    lanolin-petrolatum (A+D) ointment Apply 1 application  topically as needed for Dry Skin      omeprazole (PRILOSEC) 20 MG delayed release capsule Take 1 capsule by mouth daily      oxybutynin (DITROPAN XL) 10 MG extended release tablet Take 1 tablet by mouth daily 90 tablet 1    ferrous sulfate (IRON 325) 325 (65 Fe) MG tablet Take 1 tablet by mouth 2 times daily 60 tablet 2    calcium carbonate-vitamin D 600-200 MG-UNIT TABS Take 1 tablet by mouth 2 times daily      amitriptyline (ELAVIL) 50 MG tablet TAKE 1 TABLET AT BEDTIME      aspirin 81 MG chewable tablet Take 1 tablet by mouth nightly      magnesium

## 2024-05-06 ENCOUNTER — TELEPHONE (OUTPATIENT)
Dept: UROLOGY | Age: 89
End: 2024-05-06

## 2024-05-06 RX ORDER — AMPICILLIN 500 MG/1
500 CAPSULE ORAL 4 TIMES DAILY
Qty: 40 CAPSULE | Refills: 0 | Status: SHIPPED | OUTPATIENT
Start: 2024-05-06 | End: 2024-05-16

## 2024-05-06 NOTE — TELEPHONE ENCOUNTER
Patient advised of the urine results and ampicillin was sent to the pharmacy. She voiced understanding.

## 2024-05-28 ENCOUNTER — TELEPHONE (OUTPATIENT)
Dept: UROLOGY | Age: 89
End: 2024-05-28

## 2024-05-28 RX ORDER — SULFAMETHOXAZOLE AND TRIMETHOPRIM 400; 80 MG/1; MG/1
1 TABLET ORAL 2 TIMES DAILY
Qty: 14 TABLET | Refills: 0 | Status: SHIPPED | OUTPATIENT
Start: 2024-05-28 | End: 2024-06-04

## 2024-05-28 NOTE — TELEPHONE ENCOUNTER
----- Message from HILDA Martines CNP sent at 5/26/2024  6:02 PM EDT -----  Please obtain final culture results  ----- Message -----  From: Raquel Duggan LPN  Sent: 5/24/2024   1:16 PM EDT  To: HILDA Martines CNP

## 2024-07-12 ENCOUNTER — OFFICE VISIT (OUTPATIENT)
Dept: UROLOGY | Age: 89
End: 2024-07-12

## 2024-07-12 VITALS
HEIGHT: 65 IN | DIASTOLIC BLOOD PRESSURE: 68 MMHG | SYSTOLIC BLOOD PRESSURE: 122 MMHG | WEIGHT: 147 LBS | BODY MASS INDEX: 24.49 KG/M2

## 2024-07-12 DIAGNOSIS — R30.0 DYSURIA: ICD-10-CM

## 2024-07-12 DIAGNOSIS — N81.11 MIDLINE CYSTOCELE: ICD-10-CM

## 2024-07-12 DIAGNOSIS — N39.0 RECURRENT UTI: ICD-10-CM

## 2024-07-12 DIAGNOSIS — N39.46 MIXED INCONTINENCE: Primary | ICD-10-CM

## 2024-07-12 LAB
BILIRUBIN, POC: NEGATIVE
BLOOD URINE, POC: NORMAL
CLARITY, POC: CLEAR
COLOR, POC: YELLOW
GLUCOSE URINE, POC: NEGATIVE
KETONES, POC: NEGATIVE
LEUKOCYTE EST, POC: NORMAL
NITRITE, POC: NEGATIVE
PH, POC: 7
POST VOID RESIDUAL (PVR): 8 ML
PROTEIN, POC: 30
SPECIFIC GRAVITY, POC: 1.02
UROBILINOGEN, POC: 0.2

## 2024-07-12 ASSESSMENT — ENCOUNTER SYMPTOMS
BACK PAIN: 0
ABDOMINAL PAIN: 0
VOMITING: 0
NAUSEA: 0

## 2024-09-05 ENCOUNTER — HOSPITAL ENCOUNTER (OUTPATIENT)
Dept: UROLOGY | Age: 89
Discharge: HOME | End: 2024-09-05
Payer: MEDICARE

## 2024-09-05 VITALS
HEART RATE: 93 BPM | TEMPERATURE: 97.7 F | SYSTOLIC BLOOD PRESSURE: 150 MMHG | RESPIRATION RATE: 16 BRPM | WEIGHT: 150.6 LBS | DIASTOLIC BLOOD PRESSURE: 76 MMHG | OXYGEN SATURATION: 96 % | HEIGHT: 65 IN | BODY MASS INDEX: 25.09 KG/M2

## 2024-09-05 LAB
BILIRUBIN, URINE: NEGATIVE
BLOOD URINE, POC: ABNORMAL
CHARACTER, URINE: ABNORMAL
COLOR, UA: YELLOW
GLUCOSE URINE: NEGATIVE MG/DL
KETONES, URINE: NEGATIVE
LEUKOCYTE CLUMPS, URINE: ABNORMAL
NITRITE, URINE: NEGATIVE
PH, URINE: 7 (ref 5–9)
PROTEIN, URINE: 30 MG/DL
SPECIFIC GRAVITY UA: 1.02 (ref 1–1.03)
UROBILINOGEN, URINE: 0.2 EU/DL (ref 0–1)

## 2024-09-05 PROCEDURE — 87086 URINE CULTURE/COLONY COUNT: CPT

## 2024-09-05 PROCEDURE — 87077 CULTURE AEROBIC IDENTIFY: CPT

## 2024-09-05 PROCEDURE — 52000 CYSTOURETHROSCOPY: CPT

## 2024-09-05 PROCEDURE — 87186 SC STD MICRODIL/AGAR DIL: CPT

## 2024-09-05 RX ORDER — DOXYCYCLINE 100 MG/1
100 CAPSULE ORAL 2 TIMES DAILY
Qty: 28 CAPSULE | Refills: 0 | Status: SHIPPED | OUTPATIENT
Start: 2024-09-05 | End: 2024-09-19

## 2024-09-05 RX ORDER — AZITHROMYCIN 500 MG/1
1000 TABLET, FILM COATED ORAL DAILY
Qty: 6 TABLET | Refills: 0 | Status: SHIPPED | OUTPATIENT
Start: 2024-09-05 | End: 2024-09-08

## 2024-09-05 RX ORDER — PHENAZOPYRIDINE HYDROCHLORIDE 200 MG/1
200 TABLET, FILM COATED ORAL 3 TIMES DAILY PRN
Qty: 9 TABLET | Refills: 0 | Status: SHIPPED | OUTPATIENT
Start: 2024-09-05

## 2024-09-05 RX ORDER — OXYBUTYNIN CHLORIDE 10 MG/1
10 TABLET, EXTENDED RELEASE ORAL DAILY
Qty: 30 TABLET | Refills: 2 | Status: SHIPPED | OUTPATIENT
Start: 2024-09-05 | End: 2024-10-05

## 2024-09-05 NOTE — DISCHARGE INSTRUCTIONS
Discharge instructions: Cystoscopy  You May experience painful urination and see blood in the urine after your procedure.  This should resolve over time.      Pt ok to discharge home in good condition  No heavy lifting, >10 lbs for today  Pt should avoid strenuous activity for today  Pt should walk moderately at home  Pt ok to shower   Pt may resume diet as tolerated  Please call attending physician or hospital  with questions  Call or Present to ED if fever (> 101F), intractable nausea vomiting or pain.    Begin taking azithromycin, doxycycline, oxybutynin    Take pyridium as needed for urinary pain.    Follow up in office with Dr. Cotter in 1 month.  Office will call to schedule.

## 2024-09-05 NOTE — OP NOTE
Cystoscopy Operative Note  Surgeon: Dav Cotter MD  Anesthesia: Urethral 2%  Indications: dysuria  Position: supine  EBL: 1cc  Specimen: none  Findings:   The patient was prepped and draped in the usual sterile fashion.  The flexible cystoscope was advanced through the urethra and into the bladder.  The bladder was thoroughly inspected and the following was noted:    Vagina: normal appearing vagina with normal color and discharge, no lesions  Some apical tissues at introitus, not past, no anterior prolapse noted  Urethra: normal appearing urethra with no masses, tenderness or lesions  Bladder: patchy areas of inflammation, mild cloudy urine  Bladder neck normal, no obvious tumors  Ureters: Clear efflux from both ureters.  Orifices with normal configuration and location.    The cystoscope was removed.  The patient tolerated the procedure well.     She is happy with cystocele repair  Only issues is OAB and dysuria    Pyridium  Azithro doxy  Ditropan for freq noc    Fu 1 months  Maybe cytology in future

## 2024-09-05 NOTE — PROGRESS NOTES
[facility]  This procedure has been fully reviewed with the patient and written informed consent has been obtained.  1456 Procedure started with Dr. Cotter  1457 Procedure completed; patient tolerated well.  Dr. Cotter talked to patient in length about procedure findings.  Patient discharged.    PLAN     Begin taking azithromycin, doxycycline, oxybutynin    Take pyridium as needed for urinary pain.    Follow up in office with Dr. Cotter in 1 month.  Office will call to schedule.

## 2024-09-06 LAB
BACTERIA UR CULT: ABNORMAL
ORGANISM: ABNORMAL

## 2024-09-09 NOTE — TELEPHONE ENCOUNTER
CBC notable for microcytic anemia.  Will follow-up with iron stores and ferritin levels.  Patient alerted via portal. Left message asking pt to please call the office to confirm that she is switching to [facility].

## 2024-10-17 ENCOUNTER — OFFICE VISIT (OUTPATIENT)
Dept: UROLOGY | Age: 89
End: 2024-10-17

## 2024-10-17 VITALS — RESPIRATION RATE: 18 BRPM | WEIGHT: 150 LBS | BODY MASS INDEX: 24.99 KG/M2 | HEIGHT: 65 IN

## 2024-10-17 DIAGNOSIS — N81.11 MIDLINE CYSTOCELE: ICD-10-CM

## 2024-10-17 DIAGNOSIS — N39.0 RECURRENT UTI: Primary | ICD-10-CM

## 2024-10-17 DIAGNOSIS — N39.0 RECURRENT UTI: ICD-10-CM

## 2024-10-17 DIAGNOSIS — R31.21 ASYMPTOMATIC MICROSCOPIC HEMATURIA: ICD-10-CM

## 2024-10-17 DIAGNOSIS — N34.2 URETHRITIS: Primary | ICD-10-CM

## 2024-10-17 DIAGNOSIS — N39.46 MIXED INCONTINENCE: ICD-10-CM

## 2024-10-17 LAB
BILIRUBIN, POC: 1.01
BLOOD URINE, POC: NORMAL
CLARITY, POC: NORMAL
COLOR, POC: YELLOW
GLUCOSE URINE, POC: NORMAL MG/DL
KETONES, POC: NORMAL MG/DL
LEUKOCYTE EST, POC: NORMAL
NITRITE, POC: POSITIVE
PH, POC: 8.5
PROTEIN, POC: NORMAL MG/DL
SPECIFIC GRAVITY, POC: 0.2
UROBILINOGEN, POC: NORMAL MG/DL

## 2024-10-17 RX ORDER — AZITHROMYCIN 500 MG/1
1000 TABLET, FILM COATED ORAL ONCE
Qty: 2 TABLET | Refills: 0 | Status: SHIPPED | OUTPATIENT
Start: 2024-10-17 | End: 2024-10-17

## 2024-10-17 RX ORDER — TRIMETHOPRIM 100 MG/1
100 TABLET ORAL DAILY
Qty: 60 TABLET | Refills: 0 | Status: SHIPPED | OUTPATIENT
Start: 2024-10-17 | End: 2024-12-16

## 2024-10-17 RX ORDER — DOXYCYCLINE 100 MG/1
100 CAPSULE ORAL 2 TIMES DAILY
Qty: 28 CAPSULE | Refills: 0 | Status: SHIPPED | OUTPATIENT
Start: 2024-10-17 | End: 2024-10-31

## 2024-10-17 ASSESSMENT — ENCOUNTER SYMPTOMS
BACK PAIN: 0
VOMITING: 0
NAUSEA: 0
ABDOMINAL PAIN: 0

## 2024-10-17 NOTE — PROGRESS NOTES
voiding  POP  Mixed incontinence  Recent UTI  Plan:       Cysto pelvic exam 9/2024  Some apical tissues at introitus, not past, no anterior prolapse noted   Urethra: normal appearing urethra with no masses, tenderness or lesions  Bladder: patchy areas of inflammation, mild cloudy urine      She is happy with cystocele repair  Only issues is OAB and dysuria     Pyridium  Azithro doxy  Ditropan 10 for freq noc; continue.      1 month re-assess- 50 % improvement on urethritis symptoms; persistent  Ucx 9/2024 reviewed and pos for inection  Refill Azitho and Doxy  Trimethoprim daily x 2 months  Check cytology,UA, Ucx      Re-assess in 1-2 month  PFPT        Dav Cotter M.D  [facility] U[facility]

## 2024-10-28 DIAGNOSIS — R30.0 DYSURIA: Primary | ICD-10-CM

## 2024-10-28 DIAGNOSIS — N39.46 MIXED INCONTINENCE: ICD-10-CM

## 2024-10-29 ENCOUNTER — TELEPHONE (OUTPATIENT)
Dept: UROLOGY | Age: 89
End: 2024-10-29

## 2024-10-29 NOTE — TELEPHONE ENCOUNTER
Giovanni carlisle from .    Urine culture reviewed and no treatment at this time.    Informed patient of results.

## 2024-12-19 ENCOUNTER — OFFICE VISIT (OUTPATIENT)
Dept: UROLOGY | Age: 88
End: 2024-12-19

## 2024-12-19 VITALS — HEIGHT: 65 IN | WEIGHT: 150 LBS | RESPIRATION RATE: 16 BRPM | BODY MASS INDEX: 24.99 KG/M2

## 2024-12-19 DIAGNOSIS — N39.46 MIXED INCONTINENCE: ICD-10-CM

## 2024-12-19 DIAGNOSIS — R10.9 FLANK PAIN: ICD-10-CM

## 2024-12-19 DIAGNOSIS — N34.2 URETHRITIS: ICD-10-CM

## 2024-12-19 DIAGNOSIS — N81.11 MIDLINE CYSTOCELE: ICD-10-CM

## 2024-12-19 DIAGNOSIS — N39.0 RECURRENT UTI: Primary | ICD-10-CM

## 2024-12-19 LAB
BILIRUBIN, URINE: NEGATIVE
BLOOD URINE, POC: ABNORMAL
CHARACTER, URINE: CLEAR
COLOR, UA: YELLOW
GLUCOSE URINE: NEGATIVE MG/DL
KETONES, URINE: NEGATIVE
LEUKOCYTE CLUMPS, URINE: ABNORMAL
NITRITE, URINE: NEGATIVE
PH, URINE: 7 (ref 5–9)
PROTEIN, URINE: 30 MG/DL
SPECIFIC GRAVITY UA: 1.02 (ref 1–1.03)
UROBILINOGEN, URINE: 0.2 EU/DL (ref 0–1)

## 2024-12-19 RX ORDER — AZITHROMYCIN 500 MG/1
1000 TABLET, FILM COATED ORAL DAILY
COMMUNITY
Start: 2024-10-17

## 2024-12-19 ASSESSMENT — ENCOUNTER SYMPTOMS
ABDOMINAL PAIN: 0
NAUSEA: 0
VOMITING: 0
BACK PAIN: 0

## 2024-12-19 NOTE — PROGRESS NOTES
[facility] [place] [facility]  [facility] UROLOGY  [address]  [address]  [facility] [address]  Dept: [phone]  Loc: [phone]    Visit Date: 12/19/2024        HPI:     Keely Ballard is a 89 y.o. female who presents today for:  Chief Complaint   Patient presents with    Follow-up     urethritis       HPI    Pt seen in follow up after recent surgery.      Pt has a hx of POP with prior surgery in 2012.   Recent recurrence/worsening and unable to tolerate pessary.    Worsened Mixed incontinence that failed medication therapy      Underwent cystoscopy, anterior colporrhaphy cystocele on 3/14/24 by Dr. Cotter.       Reports the pressure she was having preoperatively has resolved but now has a sharp pain in urethra that lasts as long as 15 seconds and occurs at the end of voiding.  No change in pain when treated with antibiotics.     Notes vesicare did nothing for her incontinence and she prefers to stop it and hold off on further medication therapy at this time.     Current Outpatient Medications   Medication Sig Dispense Refill    azithromycin (ZITHROMAX) 500 MG tablet Take 2 tablets by mouth daily      oxyBUTYnin (DITROPAN-XL) 10 MG extended release tablet Take 1 tablet by mouth daily 30 tablet 2    lanolin-petrolatum (A+D) ointment Apply 1 application  topically as needed for Dry Skin      omeprazole (PRILOSEC) 20 MG delayed release capsule Take 1 capsule by mouth daily      calcium carbonate-vitamin D 600-200 MG-UNIT TABS Take 1 tablet by mouth 2 times daily      amitriptyline (ELAVIL) 50 MG tablet TAKE 1 TABLET AT BEDTIME      aspirin 81 MG chewable tablet Take 1 tablet by mouth nightly      magnesium (MAGNESIUM-OXIDE) 250 MG TABS tablet Take 1 tablet by mouth 2 times daily      metoprolol succinate (TOPROL XL) 25 MG extended release tablet TAKE 1 TABLET BY MOUTH ONCE DAILY      niacin 500 MG extended release capsule Take 1 capsule by mouth 2 times daily      simvastatin (ZOCOR) 10 MG

## 2024-12-20 ENCOUNTER — TELEPHONE (OUTPATIENT)
Dept: UROLOGY | Age: 88
End: 2024-12-20

## 2024-12-20 NOTE — TELEPHONE ENCOUNTER
Patient scheduled for a renal US  at [facility] on [id number].  Arrival of 11:00am for a 11:15am scan time.  Order mailed with instructions

## 2025-01-27 NOTE — TELEPHONE ENCOUNTER
Keely Ballard called requesting a refill on the following medications:  Requested Prescriptions     Pending Prescriptions Disp Refills    oxyBUTYnin (DITROPAN-XL) 10 MG extended release tablet [Pharmacy Med Name: Oxybutynin Chloride ER 10 MG Oral Tablet Extended Release 24 Hour] 30 tablet 0     Sig: Take 1 tablet by mouth once daily     Pharmacy verified:  [email or web address]      Date of last visit: 12/19/2024  Date of next visit (if applicable): 01/30/2025

## 2025-01-28 RX ORDER — OXYBUTYNIN CHLORIDE 10 MG/1
10 TABLET, EXTENDED RELEASE ORAL DAILY
Qty: 30 TABLET | Refills: 0 | Status: SHIPPED | OUTPATIENT
Start: 2025-01-28 | End: 2025-01-30

## 2025-01-29 ENCOUNTER — HOSPITAL ENCOUNTER (OUTPATIENT)
Dept: ULTRASOUND IMAGING | Age: 89
Discharge: HOME | End: 2025-01-29
Attending: RADIOLOGY

## 2025-01-29 DIAGNOSIS — Z00.6 EXAMINATION FOR NORMAL COMPARISON FOR CLINICAL RESEARCH: ICD-10-CM

## 2025-01-30 ENCOUNTER — OFFICE VISIT (OUTPATIENT)
Dept: UROLOGY | Age: 89
End: 2025-01-30
Payer: MEDICARE

## 2025-01-30 VITALS
SYSTOLIC BLOOD PRESSURE: 124 MMHG | BODY MASS INDEX: 24.99 KG/M2 | WEIGHT: 150 LBS | DIASTOLIC BLOOD PRESSURE: 80 MMHG | HEIGHT: 65 IN

## 2025-01-30 DIAGNOSIS — N39.0 RECURRENT UTI: Primary | ICD-10-CM

## 2025-01-30 LAB
BILIRUBIN, POC: NEGATIVE
BLOOD URINE, POC: NORMAL
CLARITY, POC: NORMAL
COLOR, POC: YELLOW
GLUCOSE URINE, POC: NEGATIVE MG/DL
KETONES, POC: NEGATIVE MG/DL
LEUKOCYTE EST, POC: NORMAL
NITRITE, POC: NEGATIVE
PH, POC: 7.5
PROTEIN, POC: 100 MG/DL
SPECIFIC GRAVITY, POC: 1.02
UROBILINOGEN, POC: 0.2 MG/DL

## 2025-01-30 PROCEDURE — 1090F PRES/ABSN URINE INCON ASSESS: CPT | Performed by: NURSE PRACTITIONER

## 2025-01-30 PROCEDURE — G8427 DOCREV CUR MEDS BY ELIG CLIN: HCPCS | Performed by: NURSE PRACTITIONER

## 2025-01-30 PROCEDURE — 1123F ACP DISCUSS/DSCN MKR DOCD: CPT | Performed by: NURSE PRACTITIONER

## 2025-01-30 PROCEDURE — 81003 URINALYSIS AUTO W/O SCOPE: CPT | Performed by: NURSE PRACTITIONER

## 2025-01-30 PROCEDURE — 1159F MED LIST DOCD IN RCRD: CPT | Performed by: NURSE PRACTITIONER

## 2025-01-30 PROCEDURE — 99214 OFFICE O/P EST MOD 30 MIN: CPT | Performed by: NURSE PRACTITIONER

## 2025-01-30 PROCEDURE — G8420 CALC BMI NORM PARAMETERS: HCPCS | Performed by: NURSE PRACTITIONER

## 2025-01-30 PROCEDURE — 1036F TOBACCO NON-USER: CPT | Performed by: NURSE PRACTITIONER

## 2025-01-30 RX ORDER — PHENAZOPYRIDINE HYDROCHLORIDE 200 MG/1
200 TABLET, FILM COATED ORAL 3 TIMES DAILY PRN
Qty: 9 TABLET | Refills: 0 | Status: SHIPPED | OUTPATIENT
Start: 2025-01-30

## 2025-01-30 RX ORDER — ESTRADIOL 0.1 MG/G
CREAM VAGINAL
Qty: 42.5 G | Refills: 3 | Status: SHIPPED | OUTPATIENT
Start: 2025-01-30

## 2025-01-30 RX ORDER — TROSPIUM CHLORIDE 20 MG/1
20 TABLET, FILM COATED ORAL NIGHTLY
Qty: 90 TABLET | Refills: 1 | Status: SHIPPED | OUTPATIENT
Start: 2025-01-30

## 2025-01-30 ASSESSMENT — ENCOUNTER SYMPTOMS
ABDOMINAL PAIN: 0
NAUSEA: 0
VOMITING: 0
BACK PAIN: 0

## 2025-01-30 NOTE — PROGRESS NOTES
[facility] [place] [facility]  [facility] UROLOGY  [address] [place]  [facility] [address]  Dept: [phone]  Loc: [phone]    Visit Date: 1/30/2025        HPI:     Keely Ballard is a 89 y.o. female who presents today for:  Chief Complaint   Patient presents with    Follow-up     Review CHANELL       HPI    Pt seen in follow up for recurrent UTI, oab     Pt has a hx of POP with prior surgery in 2012.   Recent recurrence/worsening and unable to tolerate pessary.    Worsened Mixed incontinence that failed medication therapy previously     Underwent cystoscopy, anterior colporrhaphy cystocele on 3/14/24 by Dr. Cotter.      Reported severe urethral pain for which she underwent cystoscopy and pelvic exam by Dr. Cotter 9/5/24 with normal urethra and bladder noted patchy areas of inflammation.  Pelvic exam with some apical tissue at introitus, not past, no anterior prolapse.  Started on azithromycin, doxycycline, pyridium for dysuria and oxybutynin for frequency and nocturia.      Had improvement in burning with urination after medication initially but it has come back in the last week.      Current Outpatient Medications   Medication Sig Dispense Refill    oxyBUTYnin (DITROPAN-XL) 10 MG extended release tablet Take 1 tablet by mouth once daily 30 tablet 0    lanolin-petrolatum (A+D) ointment Apply 1 application  topically as needed for Dry Skin      omeprazole (PRILOSEC) 20 MG delayed release capsule Take 1 capsule by mouth daily      calcium carbonate-vitamin D 600-200 MG-UNIT TABS Take 1 tablet by mouth 2 times daily      amitriptyline (ELAVIL) 50 MG tablet TAKE 1 TABLET AT BEDTIME      aspirin 81 MG chewable tablet Take 1 tablet by mouth nightly      magnesium (MAGNESIUM-OXIDE) 250 MG TABS tablet Take 1 tablet by mouth 2 times daily      metoprolol succinate (TOPROL XL) 25 MG extended release tablet TAKE 1 TABLET BY MOUTH ONCE DAILY      niacin 500 MG extended release capsule Take 1 capsule by

## 2025-01-30 NOTE — PATIENT INSTRUCTIONS
Take D mannose 500 mg three times daily.   Start vaginal estrogen cream.   Start trospium 20 mg nightly  Will call with results of urine culture

## 2025-02-02 LAB
BACTERIA UR CULT: ABNORMAL
ORGANISM: ABNORMAL

## 2025-02-03 ENCOUNTER — TELEPHONE (OUTPATIENT)
Dept: UROLOGY | Age: 89
End: 2025-02-03

## 2025-02-03 RX ORDER — CEPHALEXIN 500 MG/1
500 CAPSULE ORAL 3 TIMES DAILY
Qty: 21 CAPSULE | Refills: 0 | Status: SHIPPED | OUTPATIENT
Start: 2025-02-03 | End: 2025-02-10

## 2025-02-03 NOTE — TELEPHONE ENCOUNTER
Patient advised of the urine results and keflex was sent to the pharmacy. She voiced understanding.   Hx of recurrent GIB  -No overt bleeding.  -Hb stable.  -c/w monitoring

## 2025-03-17 ENCOUNTER — TELEPHONE (OUTPATIENT)
Dept: UROLOGY | Age: 89
End: 2025-03-17

## 2025-03-17 RX ORDER — DOXYCYCLINE HYCLATE 100 MG
100 TABLET ORAL 2 TIMES DAILY
Qty: 28 TABLET | Refills: 0 | Status: SHIPPED | OUTPATIENT
Start: 2025-03-17 | End: 2025-03-31

## 2025-03-17 NOTE — TELEPHONE ENCOUNTER
Please arrange for Guidance testing to be sent to patient's home.  Script for doxycycline sent to patient's pharmacy.      Patient should take medication with food and drink at least 8 ounces (240 mls) of water when swallowing the medication.  Pt should also sit up at least 30 minutes after taking medication to reduce the risk of esophageal irritation.   Medication can make pt more likely to experience severe sunburn.  Avoid prolonged unprotected sun exposure while taking medication.

## 2025-03-17 NOTE — TELEPHONE ENCOUNTER
Orders faxed to [facility] for Guidance test. Patient will complete the urine once kit received and follow the administration instructions for the doxycycline.

## 2025-03-17 NOTE — TELEPHONE ENCOUNTER
After using estrogen cream last night she developed a lot of burning.    She would like a prescription sent to the pharmacy and not go to the lab. She has been up all night.    Denies burning, urgency, or frequency.

## 2025-04-30 ENCOUNTER — OFFICE VISIT (OUTPATIENT)
Dept: NEPHROLOGY | Age: 89
End: 2025-04-30
Payer: MEDICARE

## 2025-04-30 VITALS
HEART RATE: 98 BPM | DIASTOLIC BLOOD PRESSURE: 78 MMHG | OXYGEN SATURATION: 97 % | SYSTOLIC BLOOD PRESSURE: 135 MMHG | WEIGHT: 150 LBS | HEIGHT: 65 IN | BODY MASS INDEX: 24.99 KG/M2

## 2025-04-30 DIAGNOSIS — I12.9 HYPERTENSIVE RENAL DISEASE, STAGE 1 THROUGH STAGE 4 OR UNSPECIFIED CHRONIC KIDNEY DISEASE: ICD-10-CM

## 2025-04-30 DIAGNOSIS — N18.32 CHRONIC KIDNEY DISEASE, STAGE 3B (HCC): Primary | ICD-10-CM

## 2025-04-30 DIAGNOSIS — N28.1 RENAL CYST: ICD-10-CM

## 2025-04-30 PROCEDURE — G8420 CALC BMI NORM PARAMETERS: HCPCS | Performed by: INTERNAL MEDICINE

## 2025-04-30 PROCEDURE — 1090F PRES/ABSN URINE INCON ASSESS: CPT | Performed by: INTERNAL MEDICINE

## 2025-04-30 PROCEDURE — 99213 OFFICE O/P EST LOW 20 MIN: CPT | Performed by: INTERNAL MEDICINE

## 2025-04-30 PROCEDURE — 1123F ACP DISCUSS/DSCN MKR DOCD: CPT | Performed by: INTERNAL MEDICINE

## 2025-04-30 PROCEDURE — 1036F TOBACCO NON-USER: CPT | Performed by: INTERNAL MEDICINE

## 2025-04-30 PROCEDURE — G8427 DOCREV CUR MEDS BY ELIG CLIN: HCPCS | Performed by: INTERNAL MEDICINE

## 2025-04-30 PROCEDURE — 1159F MED LIST DOCD IN RCRD: CPT | Performed by: INTERNAL MEDICINE

## 2025-04-30 NOTE — PROGRESS NOTES
[facility] [place] [facility]  [facility]  [address]  [facility] [address]  Dept: [phone]  Loc: [phone]  Office Progress Note  4/30/2025 2:25 PM      Pt Name:    Keely Ballard  YOB: 1931  Primary Care Physician:  Brittany Benton, APRN - CNP     Chief Complaint:   Chief Complaint   Patient presents with    Chronic Kidney Disease     Stage 3        Background Information/Interval History:   The patient is a 89 y.o. pleasant white female with hx HTN, anxiety, hx UTI who is here for follow-up evaluation of elevated creatinine.  Serum creatinine was 1.4 in Sept 2019 and 1.5 in Feb 2019.  Has hx knee arthritis. No family hx kidney problems. She reports hx HTN and mostly numbers are in 120-130s at home.  Used to smoke but quit about 40 years ago, smoked 2 packs per week for 4 years. She reports she saw rheumatologist for arthritis in [place] and was previously on arthrotec.  Subsequently she took diclofenac for atleast 20+ years twice daily. She stopped taking this on Aug 20, 2021. She has also been on PPI for many years.     Nov 2023:  Since last office she had worsening urinary incontinence. Saw Dr. Yee who referred her to Dr. Jett (urology). She reports pessary did not work for pelvic prolapse. She reports she had SP catheter placed but it was causing lot of pain and issues. She says she then had temp mc catheter placed and eventually that was removed. So, at this point both mc and SP catheter have been removed. She does not want to follow with Dr. Jett. She is requesting to see another urologist. Will refer her to [facility] urology.    May 2024: Seeing urology for cystocele. She is scheduled for follow-up tomorrow. Had cystoscopy anterior colporrhaphy cystocele . Feeling better.     April 2025: Pt is here for follow-up. Following with urology for UTIs. No fever or chills. No urinary symptoms today. Seeing urology tomorrow.      Past

## 2025-05-01 ENCOUNTER — OFFICE VISIT (OUTPATIENT)
Dept: UROLOGY | Age: 89
End: 2025-05-01

## 2025-05-01 ENCOUNTER — TELEPHONE (OUTPATIENT)
Dept: UROLOGY | Age: 89
End: 2025-05-01

## 2025-05-01 VITALS
DIASTOLIC BLOOD PRESSURE: 88 MMHG | HEIGHT: 65 IN | WEIGHT: 150 LBS | SYSTOLIC BLOOD PRESSURE: 138 MMHG | BODY MASS INDEX: 24.99 KG/M2

## 2025-05-01 DIAGNOSIS — N39.46 MIXED INCONTINENCE: ICD-10-CM

## 2025-05-01 DIAGNOSIS — N39.0 RECURRENT UTI: Primary | ICD-10-CM

## 2025-05-01 LAB
BILIRUBIN, POC: NEGATIVE
BLOOD URINE, POC: NORMAL
CLARITY, POC: CLEAR
COLOR, POC: YELLOW
GLUCOSE URINE, POC: NEGATIVE MG/DL
KETONES, POC: NEGATIVE MG/DL
LEUKOCYTE EST, POC: NORMAL
NITRITE, POC: NEGATIVE
PH, POC: 7.5
POST VOID RESIDUAL (PVR): 57 ML
PROTEIN, POC: 30 MG/DL
SPECIFIC GRAVITY, POC: 1.01
UROBILINOGEN, POC: 0.2 MG/DL

## 2025-05-01 RX ORDER — FESOTERODINE FUMARATE 8 MG/1
8 TABLET, FILM COATED, EXTENDED RELEASE ORAL DAILY
Qty: 30 TABLET | Refills: 3 | Status: SHIPPED | OUTPATIENT
Start: 2025-05-01

## 2025-05-01 RX ORDER — MIRABEGRON 50 MG/1
50 TABLET, FILM COATED, EXTENDED RELEASE ORAL DAILY
Qty: 30 TABLET | Refills: 2 | Status: SHIPPED | OUTPATIENT
Start: 2025-05-01

## 2025-05-01 ASSESSMENT — ENCOUNTER SYMPTOMS
BACK PAIN: 0
NAUSEA: 0
ABDOMINAL PAIN: 0
VOMITING: 0

## 2025-05-01 NOTE — PROGRESS NOTES
tablet by mouth nightly      magnesium (MAGNESIUM-OXIDE) 250 MG TABS tablet Take 1 tablet by mouth 2 times daily      metoprolol succinate (TOPROL XL) 25 MG extended release tablet TAKE 1 TABLET BY MOUTH ONCE DAILY      niacin 500 MG extended release capsule Take 1 capsule by mouth 2 times daily      simvastatin (ZOCOR) 10 MG tablet TAKE 1 TABLET EVERY EVENINGAT 6PM      acetaminophen (TYLENOL) 325 MG tablet Take 2 tablets by mouth every 6 hours as needed for Pain      Multiple Vitamins-Minerals (ONE-A-DAY 50 PLUS PO) Take 1 tablet by mouth daily      estradiol (ESTRACE VAGINAL) 0.1 MG/GM vaginal cream Apply 0.5g amount daily as directed for two weeks then decrease to twice daily. (Patient not taking: Reported on 5/1/2025) 42.5 g 3     No current facility-administered medications for this visit.       Past Medical History  Keely  has a past medical history of Anxiety, Dysuria, GERD (gastroesophageal reflux disease), Hypertension, Insomnia, Mixed hyperlipidemia, Osteoarthritis, and Renal insufficiency.    Past Surgical History  The patient  has a past surgical history that includes sigmoidoscopy; Upper gastrointestinal endoscopy; Colonoscopy; Hysterectomy; Tubal ligation; Rectal surgery; Suprapubic catheter (10/04/2023); and Vagina surgery (N/A, 3/14/2024).    Family History  This patient's family history includes Cancer in her mother and sister.    Social History  Keely  reports that she has quit smoking. She has never used smokeless tobacco. She reports current alcohol use. She reports that she does not currently use drugs.      Subjective:      Review of Systems   Constitutional:  Negative for activity change, appetite change, chills, diaphoresis, fatigue, fever and unexpected weight change.   Gastrointestinal:  Negative for abdominal pain, nausea and vomiting.   Genitourinary:  Positive for frequency. Negative for decreased urine volume, difficulty urinating, dysuria, flank pain, hematuria and urgency.

## 2025-05-03 LAB
BACTERIA UR CULT: ABNORMAL
ORGANISM: ABNORMAL

## 2025-05-05 ENCOUNTER — TELEPHONE (OUTPATIENT)
Dept: UROLOGY | Age: 89
End: 2025-05-05

## 2025-05-05 RX ORDER — CIPROFLOXACIN 500 MG/1
500 TABLET, FILM COATED ORAL 2 TIMES DAILY
Qty: 28 TABLET | Refills: 0 | Status: SHIPPED | OUTPATIENT
Start: 2025-05-05 | End: 2025-05-19

## 2025-05-05 NOTE — TELEPHONE ENCOUNTER
Pt's urine culture with bacteria.  Script for Cipro x 2 weeks sent to pharmacy. Start keflex nightly after course of cipro.

## 2025-05-05 NOTE — TELEPHONE ENCOUNTER
Patient advised of the urine results and to take cipro. Once cipro is complete to start keflex. She voiced understanding.

## 2025-05-15 ENCOUNTER — OFFICE VISIT (OUTPATIENT)
Dept: UROLOGY | Age: 89
End: 2025-05-15
Payer: MEDICARE

## 2025-05-15 VITALS
BODY MASS INDEX: 24.66 KG/M2 | DIASTOLIC BLOOD PRESSURE: 80 MMHG | HEIGHT: 65 IN | SYSTOLIC BLOOD PRESSURE: 136 MMHG | WEIGHT: 148 LBS

## 2025-05-15 DIAGNOSIS — N32.81 OAB (OVERACTIVE BLADDER): Primary | ICD-10-CM

## 2025-05-15 PROCEDURE — 1159F MED LIST DOCD IN RCRD: CPT | Performed by: NURSE PRACTITIONER

## 2025-05-15 PROCEDURE — 1123F ACP DISCUSS/DSCN MKR DOCD: CPT | Performed by: NURSE PRACTITIONER

## 2025-05-15 PROCEDURE — 1090F PRES/ABSN URINE INCON ASSESS: CPT | Performed by: NURSE PRACTITIONER

## 2025-05-15 PROCEDURE — G8420 CALC BMI NORM PARAMETERS: HCPCS | Performed by: NURSE PRACTITIONER

## 2025-05-15 PROCEDURE — 1036F TOBACCO NON-USER: CPT | Performed by: NURSE PRACTITIONER

## 2025-05-15 PROCEDURE — G8427 DOCREV CUR MEDS BY ELIG CLIN: HCPCS | Performed by: NURSE PRACTITIONER

## 2025-05-15 PROCEDURE — 99214 OFFICE O/P EST MOD 30 MIN: CPT | Performed by: NURSE PRACTITIONER

## 2025-05-15 RX ORDER — FESOTERODINE FUMARATE 4 MG/1
4 TABLET, FILM COATED, EXTENDED RELEASE ORAL DAILY
Qty: 30 TABLET | Refills: 3 | Status: SHIPPED | OUTPATIENT
Start: 2025-05-15

## 2025-05-15 ASSESSMENT — ENCOUNTER SYMPTOMS
BACK PAIN: 0
NAUSEA: 0
VOMITING: 0
ABDOMINAL PAIN: 0

## 2025-05-15 NOTE — PROGRESS NOTES
[facility] [place] [facility]  [facility] UROLOGY  [address]  [address]  [facility] [address]  Dept: [phone]  Loc: [phone]    Visit Date: 5/15/2025        HPI:     Keely Ballard is a 89 y.o. female who presents today for:  Chief Complaint   Patient presents with    Follow-up     Hospital f/u- pain was so bad, patient states it was at a 10 when she went.  She states she felt it in her urethra/vaginal area.       Patient has concerns with the Fesoterodine Fumarate ER 8 MG TB24 medication.       HPI  Pt seen in ER follow up.    Hx of POP w prior surgery 2012  Recent worsening and unable to tolerate pessary.   Underwent cystoscopy, anterior colporrhaphy cystocele on 3/14/24 by Dr. Cotter.   Initially did well and very happy with improvement in symptoms following surgery.     Starting in July 2024 pt began noting a sharp pain in urethra that last as long as 15 seconds at the end of voiding.   -Cystoscopy 9/2024 by Dr. Cotter w some apical tissues at introitus, no past, no anterior prolapse noted; pathyc areas of inflammation in the bladder.    Started ditropan, pyridium, azithromycin, doxycycline at that time.   -Had 50% improvement at OV 10/2024 and again treated with azithromycin and doxycycline and trimethoprim daily x 2 months.   -Noted initial improvement following December office visit but since that time continues to have pain in urethra associated with end of voiding.   -CHANELL 1/2025 negative for acute findings and noted left renal cyst.   -Started on estrace but didn't continue as felt it caused vaginal pain    Seen in office 5/1 and noted still having pain with urination and incontinence worse at night.   Urine culture 5/1 w E coli.  Started on cipro x 2 weeks and then to start keflex daily.  Started on fesoterodine 8 mg daily.    Presented to ER at [facility] on 5/6 for perineal pain.   exam negative for visible perineal lesions or bleeding and noted normal vulva.  CT imaging

## 2025-06-19 ENCOUNTER — OFFICE VISIT (OUTPATIENT)
Dept: UROLOGY | Age: 89
End: 2025-06-19
Payer: MEDICARE

## 2025-06-19 VITALS — HEIGHT: 65 IN | RESPIRATION RATE: 20 BRPM | WEIGHT: 148 LBS | BODY MASS INDEX: 24.66 KG/M2

## 2025-06-19 DIAGNOSIS — N39.0 RECURRENT UTI: Primary | ICD-10-CM

## 2025-06-19 DIAGNOSIS — Z51.89 TREATMENT: Primary | ICD-10-CM

## 2025-06-19 DIAGNOSIS — N81.11 MIDLINE CYSTOCELE: ICD-10-CM

## 2025-06-19 DIAGNOSIS — N39.46 MIXED INCONTINENCE: ICD-10-CM

## 2025-06-19 DIAGNOSIS — N39.46 URINARY INCONTINENCE, MIXED: ICD-10-CM

## 2025-06-19 DIAGNOSIS — N32.81 OAB (OVERACTIVE BLADDER): ICD-10-CM

## 2025-06-19 PROCEDURE — 1123F ACP DISCUSS/DSCN MKR DOCD: CPT | Performed by: UROLOGY

## 2025-06-19 PROCEDURE — G8420 CALC BMI NORM PARAMETERS: HCPCS | Performed by: UROLOGY

## 2025-06-19 PROCEDURE — 1036F TOBACCO NON-USER: CPT | Performed by: UROLOGY

## 2025-06-19 PROCEDURE — 99214 OFFICE O/P EST MOD 30 MIN: CPT | Performed by: UROLOGY

## 2025-06-19 PROCEDURE — G8427 DOCREV CUR MEDS BY ELIG CLIN: HCPCS | Performed by: UROLOGY

## 2025-06-19 PROCEDURE — 0509F URINE INCON PLAN DOCD: CPT | Performed by: UROLOGY

## 2025-06-19 PROCEDURE — 1090F PRES/ABSN URINE INCON ASSESS: CPT | Performed by: UROLOGY

## 2025-06-19 PROCEDURE — 1159F MED LIST DOCD IN RCRD: CPT | Performed by: UROLOGY

## 2025-06-19 ASSESSMENT — ENCOUNTER SYMPTOMS
VOMITING: 0
ABDOMINAL PAIN: 0
BACK PAIN: 0
NAUSEA: 0

## 2025-06-19 NOTE — PROGRESS NOTES
[facility] [place] [facility]  [facility] UROLOGY  [address]  [address]  [facility] [address]  Dept: [phone]  Loc: [phone]    Visit Date: 6/19/2025        HPI:     Keely Ballard is a 89 y.o. female who presents today for:  Chief Complaint   Patient presents with    Follow-up     Pt states she has no control of her bladder pt has been urinating her depends, pt didn't want to give a urine sample today.       HPI  Pt seen in ER follow up.    Hx of POP w prior surgery 2012  Recent worsening and unable to tolerate pessary.   Underwent cystoscopy, anterior colporrhaphy cystocele on 3/14/24 by Dr. Cotter.   Initially did well and very happy with improvement in symptoms following surgery.     Starting in July 2024 pt began noting a sharp pain in urethra that last as long as 15 seconds at the end of voiding.   -Cystoscopy 9/2024 by Dr. Cotter w some apical tissues at introitus, no past, no anterior prolapse noted; pathyc areas of inflammation in the bladder.    Started ditropan, pyridium, azithromycin, doxycycline at that time.   -Had 50% improvement at OV 10/2024 and again treated with azithromycin and doxycycline and trimethoprim daily x 2 months.   -Noted initial improvement following December office visit but since that time continues to have pain in urethra associated with end of voiding.   -CHANELL 1/2025 negative for acute findings and noted left renal cyst.   -Started on estrace but didn't continue as felt it caused vaginal pain    Seen in office 5/1 and noted still having pain with urination and incontinence worse at night.   Urine culture 5/1 w E coli.  Started on cipro x 2 weeks and then to start keflex daily.  Started on fesoterodine 8 mg daily.    Presented to ER at [facility] on 5/6 for perineal pain.   exam negative for visible perineal lesions or bleeding and noted normal vulva.  CT imaging noted mild to moderate circumferential bladder wall thickening with mild perivesicular

## 2025-06-20 ENCOUNTER — TELEPHONE (OUTPATIENT)
Dept: UROLOGY | Age: 89
End: 2025-06-20

## 2025-06-20 NOTE — TELEPHONE ENCOUNTER
Left message for the patient to call back to schedule her PNE with Dr Cotter is she wants to proceed.

## 2025-06-25 ENCOUNTER — TELEPHONE (OUTPATIENT)
Dept: UROLOGY | Age: 89
End: 2025-06-25

## 2025-07-02 ENCOUNTER — OFFICE VISIT (OUTPATIENT)
Dept: UROLOGY | Age: 89
End: 2025-07-02
Payer: MEDICARE

## 2025-07-02 VITALS
HEIGHT: 65 IN | SYSTOLIC BLOOD PRESSURE: 118 MMHG | WEIGHT: 147 LBS | BODY MASS INDEX: 24.49 KG/M2 | DIASTOLIC BLOOD PRESSURE: 62 MMHG

## 2025-07-02 DIAGNOSIS — R33.9 URINARY RETENTION: Primary | ICD-10-CM

## 2025-07-02 DIAGNOSIS — N32.81 OAB (OVERACTIVE BLADDER): ICD-10-CM

## 2025-07-02 DIAGNOSIS — N81.11 MIDLINE CYSTOCELE: ICD-10-CM

## 2025-07-02 DIAGNOSIS — N39.46 MIXED INCONTINENCE: ICD-10-CM

## 2025-07-02 DIAGNOSIS — N39.0 RECURRENT UTI: ICD-10-CM

## 2025-07-02 PROCEDURE — G8420 CALC BMI NORM PARAMETERS: HCPCS

## 2025-07-02 PROCEDURE — 0509F URINE INCON PLAN DOCD: CPT

## 2025-07-02 PROCEDURE — 1123F ACP DISCUSS/DSCN MKR DOCD: CPT

## 2025-07-02 PROCEDURE — 99214 OFFICE O/P EST MOD 30 MIN: CPT

## 2025-07-02 PROCEDURE — 1090F PRES/ABSN URINE INCON ASSESS: CPT

## 2025-07-02 PROCEDURE — 1159F MED LIST DOCD IN RCRD: CPT

## 2025-07-02 PROCEDURE — G8427 DOCREV CUR MEDS BY ELIG CLIN: HCPCS

## 2025-07-02 PROCEDURE — 1036F TOBACCO NON-USER: CPT

## 2025-07-02 RX ORDER — FESOTERODINE FUMARATE 4 MG/1
4 TABLET, FILM COATED, EXTENDED RELEASE ORAL DAILY
Qty: 30 TABLET | Refills: 3 | Status: SHIPPED | OUTPATIENT
Start: 2025-07-02

## 2025-07-02 ASSESSMENT — ENCOUNTER SYMPTOMS
VOMITING: 0
NAUSEA: 0
ABDOMINAL PAIN: 0

## 2025-07-02 NOTE — PATIENT INSTRUCTIONS
Please call the office at [phone] if you have any questions or concerns following your visit. If you were prescribed a new medication and have questions, feel free to call. For any emergent issues, please go to the nearest emergency room for further evaluation.

## 2025-08-07 ENCOUNTER — OFFICE VISIT (OUTPATIENT)
Dept: UROLOGY | Age: 89
End: 2025-08-07

## 2025-08-07 VITALS
HEIGHT: 65 IN | BODY MASS INDEX: 24.66 KG/M2 | WEIGHT: 148 LBS | SYSTOLIC BLOOD PRESSURE: 122 MMHG | DIASTOLIC BLOOD PRESSURE: 80 MMHG

## 2025-08-07 DIAGNOSIS — R33.9 URINARY RETENTION: Primary | ICD-10-CM

## 2025-08-07 LAB
BILIRUBIN, POC: NEGATIVE
BLOOD URINE, POC: NORMAL
CLARITY, POC: CLEAR
COLOR, POC: YELLOW
GLUCOSE URINE, POC: NEGATIVE MG/DL
KETONES, POC: NORMAL MG/DL
LEUKOCYTE EST, POC: NORMAL
NITRITE, POC: NEGATIVE
PH, POC: 7.5
POST VOID RESIDUAL (PVR): 0 ML
PROTEIN, POC: 30 MG/DL
SPECIFIC GRAVITY, POC: 1.01
UROBILINOGEN, POC: 0.2 MG/DL

## 2025-08-07 ASSESSMENT — ENCOUNTER SYMPTOMS
NAUSEA: 0
VOMITING: 0
BACK PAIN: 0
ABDOMINAL PAIN: 0

## 2025-08-11 ENCOUNTER — TELEPHONE (OUTPATIENT)
Dept: UROLOGY | Age: 89
End: 2025-08-11

## (undated) DEVICE — GAUZE,SPONGE,8"X4",12PLY,XRAY,STRL,LF: Brand: MEDLINE

## (undated) DEVICE — GLOVE ORANGE PI 7 1/2   MSG9075

## (undated) DEVICE — GOWN,SIRUS,NON REINFRCD,LARGE,SET IN SL: Brand: MEDLINE

## (undated) DEVICE — DRAPE,UNDERBUTTOCKS,PCH,STERILE: Brand: MEDLINE

## (undated) DEVICE — SUTURE PERMA-HAND SZ 2-0 L30IN NONABSORBABLE BLK L26MM SH K833H

## (undated) DEVICE — SUTURE VCRL + SZ 3-0 L27IN ABSRB UD L26MM SH 1/2 CIR VCP416H

## (undated) DEVICE — CATHETER,FOLEY,100%SILICONE,16FR,10ML,LF: Brand: MEDLINE

## (undated) DEVICE — SYRINGE IRRIG 60ML SFT PLIABLE BLB EZ TO GRP 1 HND USE W/

## (undated) DEVICE — SUTURE VCRL SZ 2-0 L27IN ABSRB VLT L22MM CT-3 1/2 CIR J328H

## (undated) DEVICE — PENCIL SMK EVAC ALL IN 1 DSGN ENH VISIBILITY IMPROVED AIR

## (undated) DEVICE — TUBING, SUCTION, 1/4" X 20', STRAIGHT: Brand: MEDLINE INDUSTRIES, INC.

## (undated) DEVICE — ELECTRODE PT RET AD L9FT HI MOIST COND ADH HYDRGEL CORDED

## (undated) DEVICE — BANDAGE,GAUZE,4.5"X4.1YD,STERILE,LF: Brand: MEDLINE

## (undated) DEVICE — HYPODERMIC SAFETY NEEDLE: Brand: MAGELLAN

## (undated) DEVICE — SYRINGE MED 10ML LUERLOCK TIP W/O SFTY DISP

## (undated) DEVICE — CYSTO PACK: Brand: MEDLINE INDUSTRIES, INC.

## (undated) DEVICE — COUNTER NDL 40 COUNT HLD 70 FOAM BLK ADH W/ MAG